# Patient Record
Sex: FEMALE | Employment: OTHER | URBAN - METROPOLITAN AREA
[De-identification: names, ages, dates, MRNs, and addresses within clinical notes are randomized per-mention and may not be internally consistent; named-entity substitution may affect disease eponyms.]

---

## 2018-05-14 ENCOUNTER — HOSPITAL ENCOUNTER (INPATIENT)
Age: 83
LOS: 5 days | Discharge: NURSING, REHAB OR LONG-TERM CARE | DRG: 064 | End: 2018-05-19
Attending: EMERGENCY MEDICINE | Admitting: FAMILY MEDICINE
Payer: MEDICARE

## 2018-05-14 ENCOUNTER — APPOINTMENT (OUTPATIENT)
Dept: CT IMAGING | Age: 83
DRG: 064 | End: 2018-05-14
Attending: EMERGENCY MEDICINE
Payer: MEDICARE

## 2018-05-14 ENCOUNTER — APPOINTMENT (OUTPATIENT)
Dept: GENERAL RADIOLOGY | Age: 83
DRG: 064 | End: 2018-05-14
Attending: EMERGENCY MEDICINE
Payer: MEDICARE

## 2018-05-14 DIAGNOSIS — R20.0 NUMBNESS AND TINGLING OF BOTH LOWER EXTREMITIES: ICD-10-CM

## 2018-05-14 DIAGNOSIS — I63.9 CEREBROVASCULAR ACCIDENT (CVA), UNSPECIFIED MECHANISM (HCC): ICD-10-CM

## 2018-05-14 DIAGNOSIS — R20.2 NUMBNESS AND TINGLING OF BOTH LOWER EXTREMITIES: ICD-10-CM

## 2018-05-14 DIAGNOSIS — R53.1 WEAKNESS: Primary | ICD-10-CM

## 2018-05-14 PROBLEM — E86.0 DEHYDRATION: Status: ACTIVE | Noted: 2018-05-14

## 2018-05-14 PROBLEM — R26.2 INABILITY TO WALK: Status: ACTIVE | Noted: 2018-05-14

## 2018-05-14 PROBLEM — R41.82 ALTERED MENTAL STATUS: Status: ACTIVE | Noted: 2018-05-14

## 2018-05-14 LAB
ALBUMIN SERPL-MCNC: 3.7 G/DL (ref 3.5–5)
ALBUMIN/GLOB SERPL: 1.2 {RATIO} (ref 1.1–2.2)
ALP SERPL-CCNC: 56 U/L (ref 45–117)
ALT SERPL-CCNC: 16 U/L (ref 12–78)
AMMONIA PLAS-SCNC: 19 UMOL/L
ANION GAP SERPL CALC-SCNC: 6 MMOL/L (ref 5–15)
APAP SERPL-MCNC: <2 UG/ML (ref 10–30)
APPEARANCE UR: CLEAR
APTT PPP: 25.9 SEC (ref 22.1–32)
AST SERPL-CCNC: 18 U/L (ref 15–37)
BACTERIA URNS QL MICRO: NEGATIVE /HPF
BASOPHILS # BLD: 0.1 K/UL (ref 0–0.1)
BASOPHILS NFR BLD: 1 % (ref 0–1)
BILIRUB SERPL-MCNC: 0.3 MG/DL (ref 0.2–1)
BILIRUB UR QL: NEGATIVE
BUN SERPL-MCNC: 23 MG/DL (ref 6–20)
BUN/CREAT SERPL: 21 (ref 12–20)
CALCIUM SERPL-MCNC: 8.7 MG/DL (ref 8.5–10.1)
CHLORIDE SERPL-SCNC: 107 MMOL/L (ref 97–108)
CO2 SERPL-SCNC: 28 MMOL/L (ref 21–32)
COLOR UR: NORMAL
CREAT SERPL-MCNC: 1.07 MG/DL (ref 0.55–1.02)
DIFFERENTIAL METHOD BLD: ABNORMAL
EOSINOPHIL # BLD: 0.3 K/UL (ref 0–0.4)
EOSINOPHIL NFR BLD: 4 % (ref 0–7)
EPITH CASTS URNS QL MICRO: NORMAL /LPF
ERYTHROCYTE [DISTWIDTH] IN BLOOD BY AUTOMATED COUNT: 12.8 % (ref 11.5–14.5)
GLOBULIN SER CALC-MCNC: 3 G/DL (ref 2–4)
GLUCOSE BLD STRIP.AUTO-MCNC: 94 MG/DL (ref 65–100)
GLUCOSE SERPL-MCNC: 86 MG/DL (ref 65–100)
GLUCOSE UR STRIP.AUTO-MCNC: NEGATIVE MG/DL
HCT VFR BLD AUTO: 35.5 % (ref 35–47)
HGB BLD-MCNC: 11.7 G/DL (ref 11.5–16)
HGB UR QL STRIP: NEGATIVE
IMM GRANULOCYTES # BLD: 0 K/UL (ref 0–0.04)
IMM GRANULOCYTES NFR BLD AUTO: 0 % (ref 0–0.5)
INR BLD: 0.9 (ref 0.9–1.2)
INR PPP: 1 (ref 0.9–1.1)
KETONES UR QL STRIP.AUTO: NEGATIVE MG/DL
LACTATE SERPL-SCNC: 1.2 MMOL/L (ref 0.4–2)
LEUKOCYTE ESTERASE UR QL STRIP.AUTO: NEGATIVE
LYMPHOCYTES # BLD: 2.7 K/UL (ref 0.8–3.5)
LYMPHOCYTES NFR BLD: 28 % (ref 12–49)
MCH RBC QN AUTO: 32.5 PG (ref 26–34)
MCHC RBC AUTO-ENTMCNC: 33 G/DL (ref 30–36.5)
MCV RBC AUTO: 98.6 FL (ref 80–99)
MONOCYTES # BLD: 1 K/UL (ref 0–1)
MONOCYTES NFR BLD: 11 % (ref 5–13)
NEUTS SEG # BLD: 5.5 K/UL (ref 1.8–8)
NEUTS SEG NFR BLD: 57 % (ref 32–75)
NITRITE UR QL STRIP.AUTO: NEGATIVE
NRBC # BLD: 0 K/UL (ref 0–0.01)
NRBC BLD-RTO: 0 PER 100 WBC
PH UR STRIP: 5.5 [PH] (ref 5–8)
PLATELET # BLD AUTO: 216 K/UL (ref 150–400)
PMV BLD AUTO: 10.4 FL (ref 8.9–12.9)
POTASSIUM SERPL-SCNC: 3.9 MMOL/L (ref 3.5–5.1)
PROT SERPL-MCNC: 6.7 G/DL (ref 6.4–8.2)
PROT UR STRIP-MCNC: NEGATIVE MG/DL
PROTHROMBIN TIME: 9.9 SEC (ref 9–11.1)
RBC # BLD AUTO: 3.6 M/UL (ref 3.8–5.2)
RBC #/AREA URNS HPF: NORMAL /HPF (ref 0–5)
SALICYLATES SERPL-MCNC: 4.3 MG/DL (ref 2.8–20)
SERVICE CMNT-IMP: NORMAL
SODIUM SERPL-SCNC: 141 MMOL/L (ref 136–145)
SP GR UR REFRACTOMETRY: <1.005 (ref 1–1.03)
THERAPEUTIC RANGE,PTTT: NORMAL SECS (ref 58–77)
TROPONIN I SERPL-MCNC: <0.04 NG/ML
TSH SERPL DL<=0.05 MIU/L-ACNC: 0.76 UIU/ML (ref 0.36–3.74)
UR CULT HOLD, URHOLD: NORMAL
UROBILINOGEN UR QL STRIP.AUTO: 0.2 EU/DL (ref 0.2–1)
WBC # BLD AUTO: 9.6 K/UL (ref 3.6–11)
WBC URNS QL MICRO: NORMAL /HPF (ref 0–4)

## 2018-05-14 PROCEDURE — 51701 INSERT BLADDER CATHETER: CPT

## 2018-05-14 PROCEDURE — 70450 CT HEAD/BRAIN W/O DYE: CPT

## 2018-05-14 PROCEDURE — 77030011943

## 2018-05-14 PROCEDURE — 36415 COLL VENOUS BLD VENIPUNCTURE: CPT | Performed by: EMERGENCY MEDICINE

## 2018-05-14 PROCEDURE — 85730 THROMBOPLASTIN TIME PARTIAL: CPT | Performed by: EMERGENCY MEDICINE

## 2018-05-14 PROCEDURE — 65660000000 HC RM CCU STEPDOWN

## 2018-05-14 PROCEDURE — 85610 PROTHROMBIN TIME: CPT | Performed by: EMERGENCY MEDICINE

## 2018-05-14 PROCEDURE — 71046 X-RAY EXAM CHEST 2 VIEWS: CPT

## 2018-05-14 PROCEDURE — 74011250636 HC RX REV CODE- 250/636: Performed by: FAMILY MEDICINE

## 2018-05-14 PROCEDURE — 81001 URINALYSIS AUTO W/SCOPE: CPT | Performed by: EMERGENCY MEDICINE

## 2018-05-14 PROCEDURE — 85025 COMPLETE CBC W/AUTO DIFF WBC: CPT | Performed by: EMERGENCY MEDICINE

## 2018-05-14 PROCEDURE — 80053 COMPREHEN METABOLIC PANEL: CPT | Performed by: EMERGENCY MEDICINE

## 2018-05-14 PROCEDURE — 83605 ASSAY OF LACTIC ACID: CPT | Performed by: FAMILY MEDICINE

## 2018-05-14 PROCEDURE — 93005 ELECTROCARDIOGRAM TRACING: CPT

## 2018-05-14 PROCEDURE — 82607 VITAMIN B-12: CPT | Performed by: INTERNAL MEDICINE

## 2018-05-14 PROCEDURE — 82962 GLUCOSE BLOOD TEST: CPT

## 2018-05-14 PROCEDURE — 82652 VIT D 1 25-DIHYDROXY: CPT | Performed by: INTERNAL MEDICINE

## 2018-05-14 PROCEDURE — 82140 ASSAY OF AMMONIA: CPT | Performed by: FAMILY MEDICINE

## 2018-05-14 PROCEDURE — 99285 EMERGENCY DEPT VISIT HI MDM: CPT

## 2018-05-14 PROCEDURE — 84443 ASSAY THYROID STIM HORMONE: CPT | Performed by: FAMILY MEDICINE

## 2018-05-14 PROCEDURE — 80307 DRUG TEST PRSMV CHEM ANLYZR: CPT | Performed by: FAMILY MEDICINE

## 2018-05-14 PROCEDURE — 84484 ASSAY OF TROPONIN QUANT: CPT | Performed by: EMERGENCY MEDICINE

## 2018-05-14 PROCEDURE — 74011250637 HC RX REV CODE- 250/637: Performed by: FAMILY MEDICINE

## 2018-05-14 PROCEDURE — 85610 PROTHROMBIN TIME: CPT

## 2018-05-14 RX ORDER — SODIUM CHLORIDE 9 MG/ML
75 INJECTION, SOLUTION INTRAVENOUS CONTINUOUS
Status: DISCONTINUED | OUTPATIENT
Start: 2018-05-14 | End: 2018-05-19 | Stop reason: HOSPADM

## 2018-05-14 RX ORDER — IBUPROFEN 200 MG
1 TABLET ORAL DAILY
Status: DISCONTINUED | OUTPATIENT
Start: 2018-05-15 | End: 2018-05-14

## 2018-05-14 RX ORDER — IBUPROFEN 200 MG
1 TABLET ORAL DAILY
Status: DISCONTINUED | OUTPATIENT
Start: 2018-05-14 | End: 2018-05-19 | Stop reason: HOSPADM

## 2018-05-14 RX ORDER — SODIUM CHLORIDE 0.9 % (FLUSH) 0.9 %
5-10 SYRINGE (ML) INJECTION AS NEEDED
Status: DISCONTINUED | OUTPATIENT
Start: 2018-05-14 | End: 2018-05-19 | Stop reason: HOSPADM

## 2018-05-14 RX ORDER — SODIUM CHLORIDE 0.9 % (FLUSH) 0.9 %
5-10 SYRINGE (ML) INJECTION EVERY 8 HOURS
Status: DISCONTINUED | OUTPATIENT
Start: 2018-05-14 | End: 2018-05-19 | Stop reason: HOSPADM

## 2018-05-14 RX ADMIN — SODIUM CHLORIDE 125 ML/HR: 900 INJECTION, SOLUTION INTRAVENOUS at 21:27

## 2018-05-14 RX ADMIN — SODIUM CHLORIDE 500 ML: 900 INJECTION, SOLUTION INTRAVENOUS at 21:27

## 2018-05-14 NOTE — IP AVS SNAPSHOT
Summary of Care Report The Summary of Care report has been created to help improve care coordination. Users with access to [employer] or [address] (Web-based application) may access additional patient information including the Discharge Summary. If you are not currently a [address] user and need more information, please call the number listed below in the [place] [address] section and ask to be connected with Medical Records. Facility Information Name Address Phone [address] [id number] [phone] Patient Information Patient Name Sex YUE Morales ([id number]) Female 1935 Discharge Information Admitting Provider Service Area Unit Jovana Gaming MD / [phone] [address] [facility] 6s [facility] / [phone] Discharge Provider Discharge Date/Time Discharge Disposition Destination (none) 2018 Morning (Pending) SNF (none) Patient Language Language ENGLISH [13] Hospital Problems as of 2018  Reviewed: 2018  8:48 PM by Jovana Gaming MD  
  
  
  
 Class Noted - Resolved Last Modified POA Active Problems Dehydration  2018 - Present 2018 by Jovana Gaming MD Unknown Entered by Jovana Gaming MD  
  Altered mental status  2018 - Present 2018 by Jeremy Esquivel DO Unknown Entered by Jovana Gaming MD  
  Inability to walk  2018 - Present 2018 by Jovana Gaming MD Unknown Entered by Jovana Gaming MD  
  Numbness and tingling of both lower extremities  2018 - Present 2018 by Jovana Gaming MD Unknown Entered by Jovana Gaming MD  
  * (Principal)Stroke (cerebrum) [facility])  2018 - Present 2018 by Jeremy Esquivel DO Unknown   Entered by Jeremy Esquivel DO  
  
 Non-Hospital Problems as of 5/19/2018  Reviewed: 5/14/2018  8:48 PM by Shreya Pierce MD  
 None You are allergic to the following No active allergies Current Discharge Medication List  
  
START taking these medications Dose & Instructions Dispensing Information Comments  
 clopidogrel 75 mg Tab Commonly known as:  PLAVIX Start taking on:  5/20/2018 Dose:  75 mg Take 1 Tab by mouth daily. Quantity:  30 Tab Refills:  0  
   
 levothyroxine 88 mcg tablet Commonly known as:  SYNTHROID Start taking on:  5/20/2018 Dose:  88 mcg Take 1 Tab by mouth Daily (before breakfast). Quantity:  30 Tab Refills:  0 CONTINUE these medications which have CHANGED Dose & Instructions Dispensing Information Comments  
 amLODIPine 5 mg tablet Commonly known as:  Ayaz Rubio What changed:  how much to take Dose:  2.5 mg Take 0.5 Tabs by mouth daily. Indications: hypertension Quantity:  30 Tab Refills:  0  
   
 atenolol 25 mg tablet Commonly known as:  TENORMIN What changed:  how much to take Dose:  12.5 mg Take 0.5 Tabs by mouth daily. Quantity:  30 Tab Refills:  0 CONTINUE these medications which have NOT CHANGED Dose & Instructions Dispensing Information Comments  
 gabapentin 100 mg capsule Commonly known as:  NEURONTIN Dose:  100 mg Take 100 mg by mouth three (3) times daily. Refills:  0  
   
 mirtazapine 15 mg tablet Commonly known as:  Rozanna Plume Dose:  15 mg Take 15 mg by mouth nightly. Refills:  0  
   
 simvastatin 20 mg tablet Commonly known as:  ZOCOR Dose:  20 mg Take 20 mg by mouth nightly. Refills:  0  
   
 temazepam 15 mg capsule Commonly known as:  RESTORIL Dose:  30 mg Take 2 Caps by mouth nightly as needed for Sleep. Max Daily Amount: 30 mg. Indications: Insomnia Quantity:  30 Cap Refills:  0  
   
 traZODone 50 mg tablet Commonly known as:  Victor Bloodgood  Dose:  100 mg  
 Take 2 Tabs by mouth nightly. Quantity:  30 Tab Refills:  0  
   
 VITAMIN B-12 1,000 mcg tablet Generic drug:  cyanocobalamin Dose:  1000 mcg Take 1,000 mcg by mouth daily. Refills:  0 Follow-up Information Follow up With Details Comments Contact Info [address] at [facility]. [phone] Steve Clark MD   Patient can only remember the practice name and not the physician Discharge Instructions Discharge SNF/Rehab Instructions/LTAC  
 
 
PATIENT ID: Herberth Tripathi MRN: [id number] YOB: 1935 DATE OF ADMISSION: 5/14/2018  6:40 PM   
DATE OF DISCHARGE: 5/19/2018 PRIMARY CARE PROVIDER: Steve Clark MD  
 
 
ATTENDING PHYSICIAN: Betty Sargent MD 
DISCHARGING PROVIDER: Betty Sargent MD    
To contact this individual call [phone] and ask the  to page. If unavailable ask to be transferred the Adult Hospitalist Department. CONSULTATIONS: IP CONSULT TO HOSPITALIST 
IP CONSULT TO NEUROLOGY 
IP CONSULT TO PSYCHIATRY PROCEDURES/SURGERIES: * No surgery found * ADMITTING DIAGNOSES: 
An 58-year-old white female with past medical history of peripheral neuropathy, breast cancer status post bilateral mastectomy, hyperlipidemia, hypothyroidism, presented to the emergency department from home with altered mental status and chief complaint of bilateral leg and feet numbness. She has not complained of any new onset blurred vision, diplopia, slurred speech, neck pain, back pain, chest pain, shortness of breath, cough, congestion, abdominal pain, nausea, vomiting, diarrhea, melena, dysuria, hematuria or bowel or bladder incontinence calf pain, swelling, fever, chills, rash, head and neck trauma or falls. Her son and daughter-in-law later note that the patient has poor recollection of the events from earlier today.   He notes that her symptoms were severe such that she was slumped over after eating a sandwich. There are no reports of any choking. There are no reports of any respiratory difficulty. He notes that rather she had loss of memory and amnesia of events that occurred this afternoon. He notes this is a definite change compared to her baseline. Evaluation in the emergency department for vital signs of blood pressure 125/72, heart rate 107, respirations 16, saturation 97% on room air. Labs showed a BUN of 23, creatinine 1.07. CT:  Head without contrast, atrophy, significant small vessel ischemic changes with a focal area of chronic ischemia in the left cerebellum with no acute abnormalities. Code stroke had been called. Patient admitted to neurology unit. HOSPITAL COURSE and DISCHARGE DIAGNOSES / PLAN: #. Altered mental status; resolved now, 
-Metabolic encephalopathy, related to dehydration vs Acute stroke. #. Acute stroke: MRI brain showed left parietal convexity acute ischemic changes  without significant associated hemorrhage or mass effect. 
-Carotid Doppler without hemodynamically significant stenosis -TTE reported Normal LV systolic function, EF 49-54%, no obvious wall motion abnormality, grade 1 diastolic dysfunction. -neurology evaluation appreciated; suggested to change ASA to Plavix, continue Statin and Stoke education #. Bilateral LE weakness associated with numbness; 
-Probably related to severe peripheral neuropathy, on physical exam noted decreased LE sensation but the motor strength is good. -According to the pt, this has been going on for more than 2 months. 
-On Gabapentin  
-Vit B12 WNL,  Vit D normal, copper 70 and ; Immune electrophoresis IgG 556 others with in normal limit. 
-Arrange for Neuro F/U 2-4 weeks- may consider EMG PN protocol to further evaluate LE paresthesias. 
-Continue PT/OT #. Dehydration: continue gentle IV hydration, encourage PO intake Patient agreeable to IVF #. HTN 
 BP meds adjusted; decreased Amlodipine to 2.5mg daily and Atenolol 12.5mg daily #. Hypothyroidism; on levothyroxine #. Moderate Chronic Protein Calorie Malnutrition; nutritionist consult #. Insomnia: continue prn home meds for sleep PENDING TEST RESULTS:  
At the time of discharge the following test results are still pending: none FOLLOW UP APPOINTMENTS:   
Follow-up Information Follow up With Details Comments Contact Info [address] at [employer] [facility]. [phone] Steve Clark MD   Patient can only remember the practice name and not the physician ADDITIONAL CARE RECOMMENDATIONS: Follow up with neurology for possible EMG 
 
DIET: Regular Diet Oral Nutritional Supplements: Ensure Enlive Twice daily ACTIVITY: Activity as tolerated DISCHARGE MEDICATIONS: 
 See Medication Reconciliation Form NOTIFY YOUR PHYSICIAN FOR ANY OF THE FOLLOWING:  
Fever over 101 degrees for 24 hours. Chest pain, shortness of breath, fever, chills, nausea, vomiting, diarrhea, change in mentation, falling, weakness, bleeding. Severe pain or pain not relieved by medications. Or, any other signs or symptoms that you may have questions about. DISPOSITION: 
  Home With: 
 OT  PT  HH  RN  
  
x SNF/Inpatient Rehab/LTAC Independent/assisted living Hospice Other:  
 
 
PATIENT CONDITION AT DISCHARGE:  
 
Functional status  
x Poor Deconditioned Independent Cognition  
x  Lucid Forgetful Dementia Catheters/lines (plus indication) [place] PICC   
 PEG   
x None Code status  
 x Full code DNR   
 
PHYSICAL EXAMINATION AT DISCHARGE: 
  
Constitutional:  No acute distress, cooperative, pleasant ENT:  Oral mucous moist, oropharynx benign. Neck supple, Resp:  CTA bilaterally. No wheezing/rhonchi/rales. No accessory muscle use CV:  Regular rhythm, normal rate, no murmurs, gallops, rubs GI:  Soft, non distended, non tender. normoactive bowel sounds, no hepatosplenomegaly Musculoskeletal:  No edema, warm, 2+ pulses throughout Neurologic:  Moves all extremities; decreased b/l LE sensation. AAOx3, CN II-XII reviewed Psych:  Good insight, Not anxious nor agitated. CHRONIC MEDICAL DIAGNOSES: 
Problem List as of 5/19/2018  Date Reviewed: 5/14/2018 Codes Class Noted - Resolved * (Principal)Stroke (cerebrum) (HCC) ICD-10-CM: I63.9 ICD-9-CM: 434.91  5/16/2018 - Present Dehydration ICD-10-CM: E86.0 ICD-9-CM: 276.51  5/14/2018 - Present Altered mental status ICD-10-CM: R41.82 
ICD-9-CM: 780.97  5/14/2018 - Present Inability to walk ICD-10-CM: R26.2 ICD-9-CM: 719.7  5/14/2018 - Present Numbness and tingling of both lower extremities ICD-10-CM: R20.0, R20.2 ICD-9-CM: 782.0  5/14/2018 - Present Signed:  
Yrn Herrera MD 
5/19/2018 
9:52 AM 
 
  
 
Chart Review Routing History No Routing History on File

## 2018-05-14 NOTE — ED NOTES
Verbal shift change report given to Amalia Moran 44 (oncoming nurse) by Prateek Orr (offgoing nurse). Report included the following information SBAR and ED Summary.

## 2018-05-14 NOTE — ED TRIAGE NOTES
Triage note: Pt arrives in wheelchair with c/o bilateral foot and leg numbness. Per pt's family pt is usually able to ambulate but cannot bear weight today. Hx neuropathy. Pt started on gabapentin yesterday; previously taking neurontin. Pt's son adds that pt had period of AMS around 1330 while eating a sandwich, pt unable to answer questions and slumped over. No other deficits noted by family members. Symptoms have resolved. AO x 4 in triage.

## 2018-05-14 NOTE — IP AVS SNAPSHOT
[address] [facility] [address] [place] [address] 
[phone] Patient: Suyapa Stevenson MRN: [id number] RUE:[birth year] A check obed indicates which time of day the medication should be taken. My Medications START taking these medications Instructions Each Dose to Equal  
 Morning Noon Evening Bedtime  
 clopidogrel 75 mg Tab Commonly known as:  PLAVIX Start taking on:  5/20/2018 Your last dose was: Your next dose is: Take 1 Tab by mouth daily. 75 mg  
    
   
   
   
  
 levothyroxine 88 mcg tablet Commonly known as:  SYNTHROID Start taking on:  5/20/2018 Your last dose was: Your next dose is: Take 1 Tab by mouth Daily (before breakfast). 88 mcg CHANGE how you take these medications Instructions Each Dose to Equal  
 Morning Noon Evening Bedtime  
 amLODIPine 5 mg tablet Commonly known as:  Lennie Joy What changed:  how much to take Your last dose was: Your next dose is: Take 0.5 Tabs by mouth daily. Indications: hypertension 2.5 mg  
    
   
   
   
  
 atenolol 25 mg tablet Commonly known as:  TENORMIN What changed:  how much to take Your last dose was: Your next dose is: Take 0.5 Tabs by mouth daily. 12.5 mg  
    
   
   
   
  
  
CONTINUE taking these medications Instructions Each Dose to Equal  
 Morning Noon Evening Bedtime  
 gabapentin 100 mg capsule Commonly known as:  NEURONTIN Your last dose was: Your next dose is: Take 100 mg by mouth three (3) times daily. 100 mg  
    
   
   
   
  
 mirtazapine 15 mg tablet Commonly known as:  Timothy Swanson Your last dose was: Your next dose is: Take 15 mg by mouth nightly. 15 mg  
    
   
   
   
  
 simvastatin 20 mg tablet Commonly known as:  ZOCOR  
   
 Your last dose was: Your next dose is: Take 20 mg by mouth nightly. 20 mg  
    
   
   
   
  
 temazepam 15 mg capsule Commonly known as:  RESTORIL Your last dose was: Your next dose is: Take 2 Caps by mouth nightly as needed for Sleep. Max Daily Amount: 30 mg. Indications: Insomnia 30 mg  
    
   
   
   
  
 traZODone 50 mg tablet Commonly known as:  Cayetano Aquino Your last dose was: Your next dose is: Take 2 Tabs by mouth nightly. 100 mg  
    
   
   
   
  
 VITAMIN B-12 1,000 mcg tablet Generic drug:  cyanocobalamin Your last dose was: Your next dose is: Take 1,000 mcg by mouth daily. 1000 mcg Where to Get Your Medications Information on where to get these meds will be given to you by the nurse or doctor. ! Ask your nurse or doctor about these medications  
  amLODIPine 5 mg tablet  
 atenolol 25 mg tablet  
 clopidogrel 75 mg Tab  
 levothyroxine 88 mcg tablet  
 temazepam 15 mg capsule  
 traZODone 50 mg tablet

## 2018-05-14 NOTE — IP AVS SNAPSHOT
[address] 
[phone] Patient: Faustino Kay MRN: [id number] WLR:[id number] About your hospitalization You were admitted on:  May 14, 2018 You last received care in the:  [facility] 6S NEURO-SCI TELE You were discharged on:  May 19, 2018 Why you were hospitalized Your primary diagnosis was:  Stroke (Cerebrum) (Hcc) Your diagnoses also included:  Dehydration, Altered Mental Status, Inability To Walk, Numbness And Tingling Of Both Lower Extremities Follow-up Information Follow up With Details Comments Contact Info [address] at [employer]. [phone] Phys Other, MD   Patient can only remember the practice name and not the physician Discharge Orders None A check obed indicates which time of day the medication should be taken. My Medications START taking these medications Instructions Each Dose to Equal  
 Morning Noon Evening Bedtime  
 clopidogrel 75 mg Tab Commonly known as:  PLAVIX Start taking on:  5/20/2018 Your last dose was: Your next dose is: Take 1 Tab by mouth daily. 75 mg  
    
   
   
   
  
 levothyroxine 88 mcg tablet Commonly known as:  SYNTHROID Start taking on:  5/20/2018 Your last dose was: Your next dose is: Take 1 Tab by mouth Daily (before breakfast). 88 mcg CHANGE how you take these medications Instructions Each Dose to Equal  
 Morning Noon Evening Bedtime  
 amLODIPine 5 mg tablet Commonly known as:  Romaine Mcfarland What changed:  how much to take Your last dose was: Your next dose is: Take 0.5 Tabs by mouth daily. Indications: hypertension 2.5 mg  
    
   
   
   
  
 atenolol 25 mg tablet Commonly known as:  TENORMIN What changed:  how much to take Your last dose was: Your next dose is: Take 0.5 Tabs by mouth daily. 12.5 mg  
    
   
   
   
  
  
CONTINUE taking these medications Instructions Each Dose to Equal  
 Morning Noon Evening Bedtime  
 gabapentin 100 mg capsule Commonly known as:  NEURONTIN Your last dose was: Your next dose is: Take 100 mg by mouth three (3) times daily. 100 mg  
    
   
   
   
  
 mirtazapine 15 mg tablet Commonly known as:  Caitlin Nestle Your last dose was: Your next dose is: Take 15 mg by mouth nightly. 15 mg  
    
   
   
   
  
 simvastatin 20 mg tablet Commonly known as:  ZOCOR Your last dose was: Your next dose is: Take 20 mg by mouth nightly. 20 mg  
    
   
   
   
  
 temazepam 15 mg capsule Commonly known as:  RESTORIL Your last dose was: Your next dose is: Take 2 Caps by mouth nightly as needed for Sleep. Max Daily Amount: 30 mg. Indications: Insomnia 30 mg  
    
   
   
   
  
 traZODone 50 mg tablet Commonly known as:  Marcelino Austen Your last dose was: Your next dose is: Take 2 Tabs by mouth nightly. 100 mg  
    
   
   
   
  
 VITAMIN B-12 1,000 mcg tablet Generic drug:  cyanocobalamin Your last dose was: Your next dose is: Take 1,000 mcg by mouth daily. 1000 mcg Where to Get Your Medications Information on where to get these meds will be given to you by the nurse or doctor. ! Ask your nurse or doctor about these medications  
  amLODIPine 5 mg tablet  
 atenolol 25 mg tablet  
 clopidogrel 75 mg Tab  
 levothyroxine 88 mcg tablet  
 temazepam 15 mg capsule  
 traZODone 50 mg tablet Discharge Instructions Discharge SNF/Rehab Instructions/LTAC  
 
 
PATIENT ID: Manfred Mendoza MRN: [id number] YOB: 1935 DATE OF ADMISSION: 5/14/2018  6:40 PM   
 DATE OF DISCHARGE: 5/19/2018 PRIMARY CARE PROVIDER: Steve Clark MD  
 
 
ATTENDING PHYSICIAN: Jolie Wilson MD 
DISCHARGING PROVIDER: Jolie Wilson MD    
To contact this individual call [phone] and ask the  to page. If unavailable ask to be transferred the Adult Hospitalist Department. CONSULTATIONS: IP CONSULT TO HOSPITALIST 
IP CONSULT TO NEUROLOGY 
IP CONSULT TO PSYCHIATRY PROCEDURES/SURGERIES: * No surgery found * ADMITTING DIAGNOSES: 
An 80-year-old white female with past medical history of peripheral neuropathy, breast cancer status post bilateral mastectomy, hyperlipidemia, hypothyroidism, presented to the emergency department from home with altered mental status and chief complaint of bilateral leg and feet numbness. She has not complained of any new onset blurred vision, diplopia, slurred speech, neck pain, back pain, chest pain, shortness of breath, cough, congestion, abdominal pain, nausea, vomiting, diarrhea, melena, dysuria, hematuria or bowel or bladder incontinence calf pain, swelling, fever, chills, rash, head and neck trauma or falls. Her son and daughter-in-law later note that the patient has poor recollection of the events from earlier today. He notes that her symptoms were severe such that she was slumped over after eating a sandwich. There are no reports of any choking. There are no reports of any respiratory difficulty. He notes that rather she had loss of memory and amnesia of events that occurred this afternoon. He notes this is a definite change compared to her baseline. Evaluation in the emergency department for vital signs of blood pressure 125/72, heart rate 107, respirations 16, saturation 97% on room air. Labs showed a BUN of 23, creatinine 1.07.   CT:  Head without contrast, atrophy, significant small vessel ischemic changes with a focal area of chronic ischemia in the left cerebellum with no acute abnormalities. Code stroke had been called. Patient admitted to neurology unit. HOSPITAL COURSE and DISCHARGE DIAGNOSES / PLAN: #. Altered mental status; resolved now, 
-Metabolic encephalopathy, related to dehydration vs Acute stroke. #. Acute stroke: MRI brain showed left parietal convexity acute ischemic changes  without significant associated hemorrhage or mass effect. 
-Carotid Doppler without hemodynamically significant stenosis -TTE reported Normal LV systolic function, EF 69-91%, no obvious wall motion abnormality, grade 1 diastolic dysfunction. -neurology evaluation appreciated; suggested to change ASA to Plavix, continue Statin and Stoke education #. Bilateral LE weakness associated with numbness; 
-Probably related to severe peripheral neuropathy, on physical exam noted decreased LE sensation but the motor strength is good. -According to the pt, this has been going on for more than 2 months. 
-On Gabapentin  
-Vit B12 WNL,  Vit D normal, copper 70 and ; Immune electrophoresis IgG 556 others with in normal limit. 
-Arrange for Neuro F/U 2-4 weeks- may consider EMG PN protocol to further evaluate LE paresthesias. 
-Continue PT/OT #. Dehydration: continue gentle IV hydration, encourage PO intake Patient agreeable to IVF #. HTN 
BP meds adjusted; decreased Amlodipine to 2.5mg daily and Atenolol 12.5mg daily #. Hypothyroidism; on levothyroxine #. Moderate Chronic Protein Calorie Malnutrition; nutritionist consult #. Insomnia: continue prn home meds for sleep PENDING TEST RESULTS:  
At the time of discharge the following test results are still pending: none FOLLOW UP APPOINTMENTS:   
Follow-up Information Follow up With Details Comments Contact Info [address] at [employer]. [phone] Steve Clark MD   Patient can only remember the practice name and not the physician ADDITIONAL CARE RECOMMENDATIONS: Follow up with neurology for possible EMG 
 
DIET: Regular Diet Oral Nutritional Supplements: Ensure Enlive Twice daily ACTIVITY: Activity as tolerated DISCHARGE MEDICATIONS: 
 See Medication Reconciliation Form NOTIFY YOUR PHYSICIAN FOR ANY OF THE FOLLOWING:  
Fever over 101 degrees for 24 hours. Chest pain, shortness of breath, fever, chills, nausea, vomiting, diarrhea, change in mentation, falling, weakness, bleeding. Severe pain or pain not relieved by medications. Or, any other signs or symptoms that you may have questions about. DISPOSITION: 
  Home With: 
 OT  PT  HH  RN  
  
x SNF/Inpatient Rehab/LTAC Independent/assisted living Hospice Other:  
 
 
PATIENT CONDITION AT DISCHARGE:  
 
Functional status  
x Poor Deconditioned Independent Cognition  
x  Lucid Forgetful Dementia Catheters/lines (plus indication) [place]ada PICC   
 PEG   
x None Code status  
 x Full code DNR   
 
PHYSICAL EXAMINATION AT DISCHARGE: 
  
Constitutional:  No acute distress, cooperative, pleasant ENT:  Oral mucous moist, oropharynx benign. Neck supple, Resp:  CTA bilaterally. No wheezing/rhonchi/rales. No accessory muscle use CV:  Regular rhythm, normal rate, no murmurs, gallops, rubs GI:  Soft, non distended, non tender. normoactive bowel sounds, no hepatosplenomegaly Musculoskeletal:  No edema, warm, 2+ pulses throughout Neurologic:  Moves all extremities; decreased b/l LE sensation. AAOx3, CN II-XII reviewed Psych:  Good insight, Not anxious nor agitated. CHRONIC MEDICAL DIAGNOSES: 
Problem List as of 5/19/2018  Date Reviewed: 5/14/2018 Codes Class Noted - Resolved * (Principal)Stroke (cerebrum) (HCC) ICD-10-CM: I63.9 ICD-9-CM: 434.91  5/16/2018 - Present Dehydration ICD-10-CM: E86.0 ICD-9-CM: 276.51  5/14/2018 - Present Altered mental status ICD-10-CM: R41.82 
ICD-9-CM: 780.97  5/14/2018 - Present Inability to walk ICD-10-CM: R26.2 ICD-9-CM: 719.7  5/14/2018 - Present Numbness and tingling of both lower extremities ICD-10-CM: R20.0, R20.2 ICD-9-CM: 782.0  5/14/2018 - Present Signed:  
Rafael White MD 
5/19/2018 
9:52 AM 
 
  
 
  
  
  
[facility] Announcement We are excited to announce that we are making your provider's discharge notes available to you in [facility]. You will see these notes when they are completed and signed by the physician that discharged you from your recent hospital stay. If you have any questions or concerns about any information you see in [facility], please call the Health Information Department where you were seen or reach out to your Primary Care Provider for more information about your plan of care. Introducing [facility]! [facility] introduces [facility] patient portal. Now you can access parts of your medical record, email your doctor's office, and request medication refills online. 1. In your internet browser, go to [email or web address] 2. Click on the First Time User? Click Here link in the Sign In box. You will see the New Member Sign Up page. 3. Enter your [facility] Access Code exactly as it appears below. You will not need to use this code after youve completed the sign-up process. If you do not sign up before the expiration date, you must request a new code. · [facility] Access Code: [email or web address] Expires: 8/12/2018  6:34 PM 
 
4. Enter the last four digits of your Social Security Number (xxxx) and Date of Birth (mm/dd/yyyy) as indicated and click Submit. You will be taken to the next sign-up page. 5. Create a [employer] ID. This will be your [facility] login ID and cannot be changed, so think of one that is secure and easy to remember. 6. Create a [employer] password. You can change your password at any time. 7. Enter your Password Reset Question and Answer. This can be used at a later time if you forget your password. 8. Enter your e-mail address. You will receive e-mail notification when new information is available in [address] 9. Click Sign Up. You can now view and download portions of your medical record. 10. Click the Download Summary menu link to download a portable copy of your medical information. If you have questions, please visit the Frequently Asked Questions section of the [employer] website. Remember, [employer] is NOT to be used for urgent needs. For medical emergencies, dial 911. Now available from your iPhone and Android! Introducing Lopez Goff As a [facility] patient, I wanted to make you aware of our electronic visit tool called Lopez Goff. [employer]SoftArt allows you to connect within minutes with a medical provider 24 hours a day, seven days a week via a mobile device or tablet or logging into a secure website from your computer. You can access Lopez Goff from anywhere in the [place]. A virtual visit might be right for you when you have a simple condition and feel like you just dont want to get out of bed, or cant get away from work for an appointment, when your regular [facility] [facility] provider is not available (evenings, weekends or holidays), or when youre out of town and need minor care. Electronic visits cost only $49 and if the [employer] provider determines a prescription is needed to treat your condition, one can be electronically transmitted to a nearby pharmacy*. Please take a moment to enroll today if you have not already done so. The enrollment process is free and takes just a few minutes. To enroll, please download the LoHaria/SoftArt sarah to your tablet or phone, or visit [email or web address] to enroll on your computer.    
And, as an [address] [place] [address] patient with a [employer] account, the results of your visits will be scanned into your electronic medical record and your primary care provider will be able to view the scanned results. We urge you to continue to see your regular [facility] provider for your ongoing medical care. And while your primary care provider may not be the one available when you seek a Lopez Goff virtual visit, the peace of mind you get from getting a real diagnosis real time can be priceless. For more information on Lopez Espinozafin, view our Frequently Asked Questions (FAQs) at [email or web address]. Sincerely, 
 
Khushbu Thomas MD 
Chief Medical Officer [employer] *:  certain medications cannot be prescribed via [employer] Unresulted Labs-Please follow up with your PCP about these lab tests Order Current Status IMMUNOELECTROPHORESIS [facility].) Preliminary result Providers Seen During Your Hospitalization Provider Specialty Primary office phone Nimo Khan MD Emergency Medicine [phone] Mahendra Santiago MD Family Practice [phone] Qi Sharp MD Hospitalist [phone] Ruma Roth MD Internal Medicine [phone] Your Primary Care Physician (PCP) Primary Care Physician Office Phone Office Fax OTHER, PHYS ** None ** ** None ** You are allergic to the following No active allergies Recent Documentation Height Weight BMI OB Status 1.575 m 48.4 kg 19.52 kg/m2 Postmenopausal    
  
  
Emergency Contacts Name Discharge Info Relation Home Work Mobile [address] [facility] CAREGIVER [3] Son [22] [phone] Patient Belongings The following personal items are in your possession at time of discharge: 
  Dental Appliances: None  Visual Aid: Glasses, With patient      Home Medications: None   Jewelry: None  Clothing: At bedside    Other Valuables: None Please provide this summary of care documentation to your next provider. Signatures-by signing, you are acknowledging that this After Visit Summary has been reviewed with you and you have received a copy. Patient Signature:  ____________________________________________________________ Date:  ____________________________________________________________  
  
Ricarda Pane Provider Signature:  ____________________________________________________________ Date:  ____________________________________________________________

## 2018-05-14 NOTE — ED PROVIDER NOTES
HPI Comments: 80 y.o. female with past medical history significant for neuropathy, breast cancer, and hypercholesteremia who presents from home with chief complaint of numbness. EMS reports Pt was walking 5 hours ago when she slumped over, and would not talk or answer questions. However, Pt's family states Pt was able to eat/chew at that time. Per EMS, Pt has unable to bear weight on her legs since then, but she was able to talk when they arrived. Pt currently c/o numbness in her bilateral feet for 1 month. Per Pt's relatives, Pt usually is able to walk and drive unassisted, but she has been unstable walking for the last several days. Pt takes gabapentin for neuropathy and is scheduled for cardiology evaluation next week. Pt denies abdominal pain, N/V/D, urine changes, chest pain, and SOB. There are no other acute medical concerns at this time. Note written by Wil Stern, as dictated by Enedina Ramirez MD 6:46 PM    The history is provided by the patient, the EMS personnel and a relative. Past Medical History:   Diagnosis Date    Breast cancer ([facility] [address]     Hypercholesteremia     Neuropathy        No past surgical history on file. No family history on file. Social History     Social History    Marital status: N/A     Spouse name: N/A    Number of children: N/A    Years of education: N/A     Occupational History    Not on file. Social History Main Topics    Smoking status: Not on file    Smokeless tobacco: Not on file    Alcohol use Not on file    Drug use: Not on file    Sexual activity: Not on file     Other Topics Concern    Not on file     Social History Narrative    No narrative on file         ALLERGIES: Review of patient's allergies indicates no known allergies. Review of Systems   Constitutional: Negative for chills and fever. Respiratory: Negative for shortness of breath. Cardiovascular: Negative for chest pain.    Gastrointestinal: Negative for abdominal pain, constipation, diarrhea and vomiting. Genitourinary: Negative for dysuria and hematuria. Musculoskeletal: Positive for gait problem. Neurological: Positive for speech difficulty, weakness and numbness. Negative for dizziness and light-headedness. All other systems reviewed and are negative. Vitals:    05/14/18 1834   BP: 125/72   Pulse: (!) 107   Resp: 16   Temp: 97.9 °F (36.6 °C)   SpO2: 97%   Height: 5' 2\" (1.575 m)            Physical Exam   Constitutional: She is oriented to person, place, and time. She appears well-developed. No distress. HENT:   Head: Normocephalic and atraumatic. Eyes: Pupils are equal, round, and reactive to light. No scleral icterus. Neck: Normal range of motion. Neck supple. Cardiovascular: Normal rate and regular rhythm. Pulmonary/Chest: Effort normal and breath sounds normal.   Abdominal: Soft. She exhibits no distension. There is no tenderness. There is no rebound and no guarding. Musculoskeletal: Normal range of motion. Neurological: She is alert and oriented to person, place, and time. Equal strength bilaterally. No pronator drift. Skin: Skin is warm and dry. She is not diaphoretic. Psychiatric: She has a normal mood and affect. Her behavior is normal. Thought content normal.   Nursing note and vitals reviewed. Note written by Wil aCsey, as dictated by Dewey De Los Santos MD 6:46 PM    MDM  Number of Diagnoses or Management Options  Weakness: new and requires workup  Diagnosis management comments: DDX:  CVA, UTI, PNA, electrolyte abnormality, hypothyroid, peripheral neuropathy, lumbar radiculopathy    Total critical care time spent exclusive of procedures:  35 minutes          ED Course       Procedures      ED EKG interpretation:  Rhythm: normal sinus rhythm; Rate (approx.): 74; Axis: normal; ST/T wave: normal; normal intervals; no ectopy.    Note written by Wil Casey, as dictated by Dewey De Los Santos MD 7:05 PM    CONSULT NOTE:  6:47 PM Santi Rodriguez MD spoke with Dr. Chelsi Moura, Consult for Teleneurology. Discussed available diagnostic tests and clinical findings. Dr. Chelsi Moura states Pt has no focal neurologic deficits and she is outside the window for TPA. He recommends admission for stroke workup.

## 2018-05-15 ENCOUNTER — APPOINTMENT (OUTPATIENT)
Dept: MRI IMAGING | Age: 83
DRG: 064 | End: 2018-05-15
Attending: FAMILY MEDICINE
Payer: MEDICARE

## 2018-05-15 LAB
ANION GAP SERPL CALC-SCNC: 5 MMOL/L (ref 5–15)
ATRIAL RATE: 74 BPM
BASOPHILS # BLD: 0.1 K/UL (ref 0–0.1)
BASOPHILS NFR BLD: 1 % (ref 0–1)
BUN SERPL-MCNC: 18 MG/DL (ref 6–20)
BUN/CREAT SERPL: 22 (ref 12–20)
CALCIUM SERPL-MCNC: 8.3 MG/DL (ref 8.5–10.1)
CALCULATED P AXIS, ECG09: 46 DEGREES
CALCULATED R AXIS, ECG10: 21 DEGREES
CALCULATED T AXIS, ECG11: 48 DEGREES
CHLORIDE SERPL-SCNC: 112 MMOL/L (ref 97–108)
CO2 SERPL-SCNC: 27 MMOL/L (ref 21–32)
CREAT SERPL-MCNC: 0.81 MG/DL (ref 0.55–1.02)
DIAGNOSIS, 93000: NORMAL
DIFFERENTIAL METHOD BLD: ABNORMAL
EOSINOPHIL # BLD: 0.3 K/UL (ref 0–0.4)
EOSINOPHIL NFR BLD: 4 % (ref 0–7)
ERYTHROCYTE [DISTWIDTH] IN BLOOD BY AUTOMATED COUNT: 12.6 % (ref 11.5–14.5)
GLUCOSE SERPL-MCNC: 103 MG/DL (ref 65–100)
HCT VFR BLD AUTO: 32.3 % (ref 35–47)
HGB BLD-MCNC: 10.7 G/DL (ref 11.5–16)
IMM GRANULOCYTES # BLD: 0 K/UL (ref 0–0.04)
IMM GRANULOCYTES NFR BLD AUTO: 0 % (ref 0–0.5)
LYMPHOCYTES # BLD: 3 K/UL (ref 0.8–3.5)
LYMPHOCYTES NFR BLD: 35 % (ref 12–49)
MAGNESIUM SERPL-MCNC: 1.9 MG/DL (ref 1.6–2.4)
MCH RBC QN AUTO: 32.6 PG (ref 26–34)
MCHC RBC AUTO-ENTMCNC: 33.1 G/DL (ref 30–36.5)
MCV RBC AUTO: 98.5 FL (ref 80–99)
MONOCYTES # BLD: 0.9 K/UL (ref 0–1)
MONOCYTES NFR BLD: 10 % (ref 5–13)
NEUTS SEG # BLD: 4.3 K/UL (ref 1.8–8)
NEUTS SEG NFR BLD: 50 % (ref 32–75)
NRBC # BLD: 0 K/UL (ref 0–0.01)
NRBC BLD-RTO: 0 PER 100 WBC
P-R INTERVAL, ECG05: 182 MS
PHOSPHATE SERPL-MCNC: 2.8 MG/DL (ref 2.6–4.7)
PLATELET # BLD AUTO: 193 K/UL (ref 150–400)
PMV BLD AUTO: 10.5 FL (ref 8.9–12.9)
POTASSIUM SERPL-SCNC: 3.7 MMOL/L (ref 3.5–5.1)
Q-T INTERVAL, ECG07: 388 MS
QRS DURATION, ECG06: 86 MS
QTC CALCULATION (BEZET), ECG08: 430 MS
RBC # BLD AUTO: 3.28 M/UL (ref 3.8–5.2)
SODIUM SERPL-SCNC: 144 MMOL/L (ref 136–145)
VENTRICULAR RATE, ECG03: 74 BPM
VIT B12 SERPL-MCNC: 732 PG/ML (ref 193–986)
WBC # BLD AUTO: 8.7 K/UL (ref 3.6–11)

## 2018-05-15 PROCEDURE — 65660000000 HC RM CCU STEPDOWN

## 2018-05-15 PROCEDURE — 84100 ASSAY OF PHOSPHORUS: CPT | Performed by: FAMILY MEDICINE

## 2018-05-15 PROCEDURE — 83735 ASSAY OF MAGNESIUM: CPT | Performed by: FAMILY MEDICINE

## 2018-05-15 PROCEDURE — 85025 COMPLETE CBC W/AUTO DIFF WBC: CPT | Performed by: FAMILY MEDICINE

## 2018-05-15 PROCEDURE — 97116 GAIT TRAINING THERAPY: CPT

## 2018-05-15 PROCEDURE — 97161 PT EVAL LOW COMPLEX 20 MIN: CPT

## 2018-05-15 PROCEDURE — G8978 MOBILITY CURRENT STATUS: HCPCS

## 2018-05-15 PROCEDURE — 80048 BASIC METABOLIC PNL TOTAL CA: CPT | Performed by: FAMILY MEDICINE

## 2018-05-15 PROCEDURE — 74011250637 HC RX REV CODE- 250/637: Performed by: FAMILY MEDICINE

## 2018-05-15 PROCEDURE — 97165 OT EVAL LOW COMPLEX 30 MIN: CPT

## 2018-05-15 PROCEDURE — 97535 SELF CARE MNGMENT TRAINING: CPT

## 2018-05-15 PROCEDURE — 36415 COLL VENOUS BLD VENIPUNCTURE: CPT | Performed by: FAMILY MEDICINE

## 2018-05-15 PROCEDURE — G8979 MOBILITY GOAL STATUS: HCPCS

## 2018-05-15 PROCEDURE — 74011250637 HC RX REV CODE- 250/637: Performed by: INTERNAL MEDICINE

## 2018-05-15 PROCEDURE — 70551 MRI BRAIN STEM W/O DYE: CPT

## 2018-05-15 PROCEDURE — 93880 EXTRACRANIAL BILAT STUDY: CPT

## 2018-05-15 RX ORDER — SIMVASTATIN 20 MG/1
20 TABLET, FILM COATED ORAL
COMMUNITY

## 2018-05-15 RX ORDER — TEMAZEPAM 15 MG/1
30 CAPSULE ORAL
Status: ON HOLD | COMMUNITY
End: 2018-05-19

## 2018-05-15 RX ORDER — LEVOTHYROXINE SODIUM 88 UG/1
88 TABLET ORAL
Status: DISCONTINUED | OUTPATIENT
Start: 2018-05-15 | End: 2018-05-19 | Stop reason: HOSPADM

## 2018-05-15 RX ORDER — TRAZODONE HYDROCHLORIDE 50 MG/1
100 TABLET ORAL
Status: ON HOLD | COMMUNITY
End: 2018-05-19

## 2018-05-15 RX ORDER — SIMVASTATIN 40 MG/1
20 TABLET, FILM COATED ORAL
Status: DISCONTINUED | OUTPATIENT
Start: 2018-05-15 | End: 2018-05-19 | Stop reason: HOSPADM

## 2018-05-15 RX ORDER — GABAPENTIN 100 MG/1
100 CAPSULE ORAL 3 TIMES DAILY
COMMUNITY

## 2018-05-15 RX ORDER — GABAPENTIN 100 MG/1
300 CAPSULE ORAL DAILY
Status: DISCONTINUED | OUTPATIENT
Start: 2018-05-15 | End: 2018-05-19 | Stop reason: HOSPADM

## 2018-05-15 RX ORDER — LANOLIN ALCOHOL/MO/W.PET/CERES
1000 CREAM (GRAM) TOPICAL DAILY
COMMUNITY

## 2018-05-15 RX ORDER — MIRTAZAPINE 15 MG/1
15 TABLET, FILM COATED ORAL
COMMUNITY

## 2018-05-15 RX ORDER — LANOLIN ALCOHOL/MO/W.PET/CERES
1000 CREAM (GRAM) TOPICAL DAILY
Status: DISCONTINUED | OUTPATIENT
Start: 2018-05-16 | End: 2018-05-15 | Stop reason: SDUPTHER

## 2018-05-15 RX ORDER — AMLODIPINE BESYLATE 5 MG/1
5 TABLET ORAL DAILY
Status: ON HOLD | COMMUNITY
End: 2018-05-19

## 2018-05-15 RX ORDER — TEMAZEPAM 15 MG/1
30 CAPSULE ORAL
Status: DISCONTINUED | OUTPATIENT
Start: 2018-05-15 | End: 2018-05-19 | Stop reason: HOSPADM

## 2018-05-15 RX ORDER — AMLODIPINE BESYLATE 5 MG/1
5 TABLET ORAL DAILY
Status: DISCONTINUED | OUTPATIENT
Start: 2018-05-15 | End: 2018-05-17

## 2018-05-15 RX ORDER — LANOLIN ALCOHOL/MO/W.PET/CERES
1000 CREAM (GRAM) TOPICAL DAILY
Status: DISCONTINUED | OUTPATIENT
Start: 2018-05-15 | End: 2018-05-19 | Stop reason: HOSPADM

## 2018-05-15 RX ORDER — ATENOLOL 25 MG/1
25 TABLET ORAL DAILY
Status: DISCONTINUED | OUTPATIENT
Start: 2018-05-15 | End: 2018-05-17

## 2018-05-15 RX ORDER — ATENOLOL 25 MG/1
25 TABLET ORAL DAILY
Status: ON HOLD | COMMUNITY
End: 2018-05-19

## 2018-05-15 RX ORDER — MIRTAZAPINE 15 MG/1
15 TABLET, FILM COATED ORAL
Status: DISCONTINUED | OUTPATIENT
Start: 2018-05-15 | End: 2018-05-19 | Stop reason: HOSPADM

## 2018-05-15 RX ORDER — ACETAMINOPHEN 325 MG/1
650 TABLET ORAL
Status: DISCONTINUED | OUTPATIENT
Start: 2018-05-15 | End: 2018-05-19 | Stop reason: HOSPADM

## 2018-05-15 RX ORDER — TRAZODONE HYDROCHLORIDE 50 MG/1
100 TABLET ORAL
Status: DISCONTINUED | OUTPATIENT
Start: 2018-05-15 | End: 2018-05-19 | Stop reason: HOSPADM

## 2018-05-15 RX ADMIN — Medication 10 ML: at 14:00

## 2018-05-15 RX ADMIN — GABAPENTIN 300 MG: 100 CAPSULE ORAL at 18:00

## 2018-05-15 RX ADMIN — LEVOTHYROXINE SODIUM 88 MCG: 88 TABLET ORAL at 06:35

## 2018-05-15 RX ADMIN — ATENOLOL 25 MG: 25 TABLET ORAL at 11:20

## 2018-05-15 RX ADMIN — Medication 10 ML: at 05:04

## 2018-05-15 RX ADMIN — Medication 1000 MCG: at 11:20

## 2018-05-15 RX ADMIN — ACETAMINOPHEN 650 MG: 325 TABLET ORAL at 00:31

## 2018-05-15 RX ADMIN — AMLODIPINE BESYLATE 5 MG: 5 TABLET ORAL at 11:20

## 2018-05-15 RX ADMIN — TEMAZEPAM 30 MG: 15 CAPSULE ORAL at 21:18

## 2018-05-15 RX ADMIN — Medication 10 ML: at 21:18

## 2018-05-15 RX ADMIN — TRAZODONE HYDROCHLORIDE 100 MG: 50 TABLET ORAL at 21:17

## 2018-05-15 RX ADMIN — MIRTAZAPINE 15 MG: 15 TABLET, FILM COATED ORAL at 21:18

## 2018-05-15 RX ADMIN — SIMVASTATIN 20 MG: 40 TABLET, FILM COATED ORAL at 21:18

## 2018-05-15 NOTE — PROGRESS NOTES
Problem: Mobility Impaired (Adult and Pediatric)  Goal: *Acute Goals and Plan of Care (Insert Text)  Physical Therapy Goals  Initiated 5/15/2018  1. Patient will move from supine to sit and sit to supine  and scoot up and down in bed with modified independence within 7 day(s). 2.  Patient will transfer from bed to chair and chair to bed with modified independence using the least restrictive device within 7 day(s). 3.  Patient will perform sit to stand with modified independence within 7 day(s). 4.  Patient will ambulate with modified independence for 300 feet with the least restrictive device within 7 day(s). 5.  Patient will improve Fowler Balance score by 7 points within 7 days. physical Therapy EVALUATION- neuro population    Patient: Nba Becerra (52 y.o. female)  Date: 5/15/2018  Primary Diagnosis: Altered mental status  Dehydration  Numbness and tingling of both lower extremities  Inability to walk        Precautions:   Fall, Bed Alarm    ASSESSMENT :  Based on the objective data described below, the patient presents with impaired functional mobility as compared to baseline level 2* STM deficits, intermittent confusion, poor safety awareness, decreased insight to deficits, c/o B foot numbness, slightly impaired L UE gross motor coordination during FTN, impaired balance, and impaired gait following admission for AMS and B LE numbness. CT negative for CVA, MRI pending. Prior to this admission, pt reports that she lived at home alone and was indep with ADLs and mobility w/o use of AD - does admit to having increased difficulty caring for self alone. Received supine in bed- agreeable to participation in session. She was able to mobilize to EOB with SBA. Demos good sitting balance. Completed sit<>stands from various surfaces with CGA for safety 2* impulsivity.  Gait training initiated in hallway and within room with emphasis on dual task integration and head movements - required up to min A with intermittent HHA for stability with noted ?slight ataxia and posterior/R lateral lean that she does not perceive when asked. Limited ability to self correct. Pt remained up in chair at end of session, NAD. Anticipate that she is below her baseline level - question ability return to safely living alone from a cognitive and functional standpoint - pt currently in agreement. Recommending discharge to rehab setting vs home with 24/7 assist and OP PT for balance pending further work up and progress. Will follow. Patient will benefit from skilled intervention to address the above impairments. Patients rehabilitation potential is considered to be Good  Factors which may influence rehabilitation potential include:   []           None noted  []           Mental ability/status  []           Medical condition  [x]           Home/family situation and support systems  [x]           Safety awareness  []           Pain tolerance/management  []           Other:      PLAN :  Recommendations and Planned Interventions:  [x]             Bed Mobility Training             [x]      Neuromuscular Re-Education  [x]             Transfer Training                   []      Orthotic/Prosthetic Training  [x]             Gait Training                         []      Modalities  [x]             Therapeutic Exercises           []      Edema Management/Control  [x]             Therapeutic Activities            [x]      Patient and Family Training/Education  []             Other (comment):  Frequency/Duration: Patient will be followed by physical therapy 5 times a week to address goals. Discharge Recommendations: Rehab and To Be Determined  Further Equipment Recommendations for Discharge: TBD     SUBJECTIVE:   Patient stated I don't know that I can keep living by myself.     OBJECTIVE DATA SUMMARY:   HISTORY:    Past Medical History:   Diagnosis Date    Breast cancer ([facility] [address]     Hypercholesteremia     Neuropathy    No past surgical history on file.  Prior Level of Function/Home Situation: lives at home alone and was mod I with ADLs and mobility w/o use of AD. Personal factors and/or comorbidities impacting plan of care:     Home Situation  Home Environment: Private residence  # Steps to Enter: 0  One/Two Story Residence: One story  Living Alone: Yes  Support Systems: Family member(s)  Patient Expects to be Discharged to[de-identified] Unknown  Current DME Used/Available at Home: None    EXAMINATION/PRESENTATION/DECISION MAKING:   Critical Behavior:  Neurologic State: Alert  Orientation Level: Oriented X4  Cognition: Follows commands, Impulsive, Poor safety awareness  Safety/Judgement: Awareness of environment, Decreased awareness of need for assistance, Decreased awareness of need for safety, Decreased insight into deficits  Hearing: Auditory  Auditory Impairment: None  Skin:  Intact where exposed  Edema: none noted  Range Of Motion:  AROM: Within functional limits     Strength:    Strength: Generally decreased, functional     Tone & Sensation:   Tone: Normal  Sensation: Impaired (reports \"numb\" B feet but able to feel light touch)       Coordination:  Coordination: Generally decreased, functional (slight L UE dysmetria on FTN)  Vision:   Tracking: Able to track stimulus in all quadrants w/o difficulty  Diplopia: No  Functional Mobility:  Bed Mobility:     Supine to Sit: Stand-by assistance     Transfers:  Sit to Stand: Contact guard assistance  Stand to Sit: Contact guard assistance     Balance:   Sitting: Intact  Standing: Impaired; Without support  Standing - Static: Fair  Standing - Dynamic : Fair  Ambulation/Gait Training:  Distance (ft): 150 Feet (ft)  Assistive Device: Gait belt (intermittent HHA)  Ambulation - Level of Assistance: Minimal assistance  Gait Description (WDL): Exceptions to WDL  Gait Abnormalities: Ataxic; Altered arm swing;Decreased step clearance; Path deviations;Trunk sway increased  Base of Support: Narrowed; Shift to right;Center of gravity altered  Speed/Virgie: Slow     Functional Measure  Fowler Balance Test:    Sitting to Standin  Standing Unsupported: 0  Sitting with Back Unsupported: 3  Standing to Sitting: 3  Transfers: 1  Standing Unsupported with Eyes Closed: 0  Standing Unsupported with Feet Together: 0  Reach Forward with Outstretched Arm: 1   Object: 0  Turn to Look Over Shoulders: 2  Turn 360 Degrees: 1  Alternate Foot on Step/Stool: 0  Standing Unsupported One Foot in Front: 0  Stand on One Le  Total: 12         56=Maximum possible score;   0-20=High fall risk  21-40=Moderate fall risk   41-56=Low fall risk     Fowler Balance Test and G-code impairment scale:  Percentage of Impairment CH    0%   CI    1-19% CJ    20-39% CK    40-59% CL    60-79% CM    80-99% CN     100%   Fowler   Score 0-56 56 45-55 34-44 23-33 12-22 1-11 0       G codes: In compliance with CMSs Claims Based Outcome Reporting, the following G-code set was chosen for this patient based on their primary functional limitation being treated: The outcome measure chosen to determine the severity of the functional limitation was the Roman with a score of 1256 which was correlated with the impairment scale.     ? Mobility - Walking and Moving Around:     - CURRENT STATUS: CL - 60%-79% impaired, limited or restricted    - GOAL STATUS: CK - 40%-59% impaired, limited or restricted    - D/C STATUS:  ---------------To be determined---------------     Physical Therapy Evaluation Charge Determination   History Examination Presentation Decision-Making   HIGH Complexity :3+ comorbidities / personal factors will impact the outcome/ POC  MEDIUM Complexity : 3 Standardized tests and measures addressing body structure, function, activity limitation and / or participation in recreation  LOW Complexity : Stable, uncomplicated  HIGH Complexity : FOTO score of 1- 25       Based on the above components, the patient evaluation is determined to be of the following complexity level: LOW     Pain:  Pain Scale 1: Numeric (0 - 10)  Pain Intensity 1: 0      Activity Tolerance:   NAD    Please refer to the flowsheet for vital signs taken during this treatment. After treatment:   [x]     Patient left in no apparent distress sitting up in chair  []     Patient left in no apparent distress in bed  [x]     Call bell left within reach  [x]     Nursing notified  []     Caregiver present  [x]     Chair alarm activated    COMMUNICATION/EDUCATION:   The patients plan of care was discussed with: Occupational Therapist and Registered Nurse. Patient was educated regarding Her deficit(s) of impaired balance and B LE numbness as this relates to Her diagnosis of TIA vs CVA. She demonstrated Good understanding as evidenced by nodding. Patient and/or family was verbally educated on the BE FAST acronym for signs/symptoms of CVA and TIA. BE FAST was written on patient's communication board  for visual education and reinforcement. All questions answered with patient indicating good understanding. [x]  Fall prevention education was provided and the patient/caregiver indicated understanding. [x]  Patient/family have participated as able in goal setting and plan of care. [x]  Patient/family agree to work toward stated goals and plan of care. []  Patient understands intent and goals of therapy, but is neutral about his/her participation. []  Patient is unable to participate in goal setting and plan of care.     Thank you for this referral.  Sadia Roberts, PT, DPT   Time Calculation: 21 mins

## 2018-05-15 NOTE — PROGRESS NOTES
Primary Nurse Marizol Greenberg RN and Marco Antonio Keen RN performed a dual skin assessment on this patient No impairment noted  Garth score is 20

## 2018-05-15 NOTE — PROGRESS NOTES
Problem: Self Care Deficits Care Plan (Adult)  Goal: *Acute Goals and Plan of Care (Insert Text)  Occupational Therapy Goals  Initiated 5/15/2018   1. Patient will perform grooming with supervision/set-up standing >3 minutes within 7 day(s). 2.  Patient will perform lower body dressing with modified independence within 7 day(s). 3.  Patient will perform toilet transfers with supervision/set-up to toilet within 7 day(s). 4.  Patient will perform all aspects of toileting with supervision/set-up within 7 day(s). 5.  Patient will participate in upper extremity therapeutic exercise/activities with independence for 5 minutes within 7 day(s). 6.  Patient will utilize energy conservation and fall prevention techniques during functional activities with verbal cues within 7 day(s). Occupational Therapy EVALUATION  Patient: Chevy Mcgovern (3 y.o. female)  Date: 5/15/2018  Primary Diagnosis: Altered mental status  Dehydration  Numbness and tingling of both lower extremities  Inability to walk        Precautions:  Fall    ASSESSMENT :  Cleared by RN to see pt for therapy session. Based on the objective data described below, the patient presents with decreased balance, decreased sensation to bilateral feet and impulsivity and decreased safety awareness following admission for AMS and inability to walk. . CT negative for acute processes, MRI pending. Pt is from [place] (was visiting son in [facility]), lives alone and is previously I with ADLs, IADLs and functional mobility. Pt received supine in bed, alert and oriented x4, agreeable to participate. Reports numbness in feet although is able to feel light touch and distinguish temperature. Performed bed mobility with standby assist, good sitting balance at EOB. Performed Fugl Rueda UE assessment with pt and she scored 66/66 bilaterally indicating no UE impairment. Pt able to don/doff socks using leg crossover technique.  Pt stood from bedside with min A and performed near transfer to [facility]. Pt unsteady and impulsive with transfer, hand held and assist and gait belt for steadying. Returned to supine at end of session, call bell in reach and needs met. She will benefit from continued OT to address the above deficits. At this time pt requires assistance for mobility and standing ADLs due to decreased balance and safety awareness. Pending progress, recommend rehab at discharge to increase independence and safety prior to returning home. Patient will benefit from skilled intervention to address the above impairments. Patients rehabilitation potential is considered to be Good  Factors which may influence rehabilitation potential include:   [x]                None noted  []                Mental ability/status  []                Medical condition  []                Home/family situation and support systems  []                Safety awareness  []                Pain tolerance/management  []                Other:      PLAN :  Recommendations and Planned Interventions:  [x]                  Self Care Training                  [x]           Therapeutic Activities  [x]                  Functional Mobility Training    []           Cognitive Retraining  [x]                  Therapeutic Exercises           [x]           Endurance Activities  [x]                  Balance Training                   [x]           Neuromuscular Re-Education  []                  Visual/Perceptual Training     [x]      Home Safety Training  [x]                  Patient Education                 [x]           Family Training/Education  []                  Other (comment):    Frequency/Duration: Patient will be followed by occupational therapy 5 times a week to address goals. Discharge Recommendations: Rehab pending progress  Further Equipment Recommendations for Discharge: TBD at rehab     SUBJECTIVE:   Patient stated My legs just gave way and I fell.     OBJECTIVE DATA SUMMARY:   HISTORY:   Past Medical History:   Diagnosis Date    Breast cancer ([facility] [address]     Hypercholesteremia     Neuropathy    No past surgical history on file. Prior Level of Function/Environment/Context: Pt is from [place] (was visiting son in [facility]), lives alone and is previously I with ADLs, IADLs and functional mobility. Home Situation  Home Environment: Private residence  # Steps to Enter: 0  One/Two Story Residence: One story  Living Alone: Yes  Support Systems: Family member(s), Child(sampson)  Patient Expects to be Discharged to[de-identified] Unknown  Current DME Used/Available at Home: None  Tub or Shower Type: Tub/Shower combination  [x]  Right hand dominant   []  Left hand dominant    EXAMINATION OF PERFORMANCE DEFICITS:  Cognitive/Behavioral Status:  Neurologic State: Alert  Orientation Level: Oriented X4  Cognition: Follows commands; Impulsive  Perception: Appears intact  Perseveration: No perseveration noted  Safety/Judgement: Awareness of environment;Decreased awareness of need for assistance;Decreased awareness of need for safety;Decreased insight into deficits; Fall prevention    Hearing: Auditory  Auditory Impairment: None    Vision/Perceptual:    Tracking: Able to track stimulus in all quadrants w/o difficulty                 Diplopia: No    Acuity: Able to read clock/calendar on wall without difficulty; Able to read employee name badge without difficulty    Corrective Lenses: Glasses    Range of Motion:  AROM: Within functional limits          Strength:  Strength: Generally decreased, functional     Coordination:  Coordination: Generally decreased, functional (slight L UE dysmetria on FTN)            Tone & Sensation:  Tone: Normal  Sensation: Impaired (numb bilateral feet)          Balance:  Sitting: Intact  Standing: Impaired; With support (gait belt and hand held)  Standing - Static: Fair  Standing - Dynamic : Fair    Functional Mobility and Transfers for ADLs:  Bed Mobility:  Supine to Sit: Stand-by assistance  Sit to Supine: Stand-by assistance    Transfers:  Sit to Stand: Minimum assistance  Functional Transfers  Toilet Transfer : Minimum assistance        ADL Assessment:  Feeding: Independent    Oral Facial Hygiene/Grooming: Setup    Bathing: Minimum assistance; Additional time    Upper Body Dressing: Setup    Lower Body Dressing: Moderate assistance    Toileting: Minimum assistance     ADL Intervention and task modifications:   Provided verbal/tactile cues for safe transfer technique to [facility]. Instructed pt to call for assistance prior to transferring for fall prevention. Lower Body Dressing Assistance  Socks: Contact guard assistance    Toileting  Bladder Hygiene: Contact guard assistance (sitting)    Cognitive Retraining  Safety/Judgement: Awareness of environment;Decreased awareness of need for assistance;Decreased awareness of need for safety;Decreased insight into deficits; Fall prevention    Functional Measure:   Fugl-Rueda Assessment of Motor Recovery after Stroke:     Reflex Activity  Flexors/Biceps/Fingers: Can be elicited  Extensors/Triceps: Can be elicited  Reflex Subtotal: 4    Volitional Movement Within Synergies  Shoulder Retraction: Full  Shoulder Elevation: Full  Shoulder Abduction (90 degrees): Full  Shoulder External Rotation: Full  Elbow Flexion: Full  Forearm Supination: Full  Shoulder Adduction/Internal Rotation: Full  Elbow Extension: Full  Forearm Pronation: Full  Subtotal: 18    Volitional Movement Mixing Synergies  Hand to Lumbar Spine: Full  Shoulder Flexion (0-90 degrees): Full  Pronation-Supination: Full  Subtotal: 6    Volitional Movement With Little or No Synergy  Shoulder Abduction (0-90 degrees): Full  Shoulder Flexion ( degrees): Full  Pronation/Supination: Full  Subtotal : 6    Normal Reflex Activity  Biceps, Triceps, Finger Flexors:  Full  Subtotal : 2    Upper Extremity Total   Upper Extremity Total: 36    Wrist  Stability at 15 Degree Dorsiflexion: Full  Repeated Dorsiflexion/ Volar Flexion: Full  Stability at 15 Degree Dorsiflexion: Full  Repeated Dorsiflexion/ Volar Flexion: Full  Circumduction: Full  Wrist Total: 10    Hand  Mass Flexion: Full  Mass Extension: Full  Grasp A: Full  Grasp B: Full  Grasp C: Full  Grasp D: Full  Grasp E: Full  Hand Total: 14    Coordination/Speed  Tremor: None  Dysmetria: None  Time: <1s  Coordination/Speed Total : 6    Total A-D  Total A-D (Motor Function): 66/66     Percentage of impairment CH  0% CI  1-19% CJ  20-39% CK  40-59% CL  60-79% CM  80-99% CN  100%   Fugl-Rueda score: 0-66 66 53-65 39-52 26-38 13-25 1-12   0      This is a reliable/valid measure of arm function after a neurological event. It has established value to characterize functional status and for measuring spontaneous and therapy-induced recovery; tests proximal and distal motor functions. Fugl-Rueda Assessment  UE scores recorded between five and 30 days post neurologic event can be used to predict UE recovery at six months post neurologic event. Severe = 0-21 points   Moderately Severe = 22-33 points   Moderate = 34-47 points   Mild = 48-66 points  JONATHAN Lopez, SPENCER Hill, & CATRINA Laboy (1992). Measurement of motor recovery after stroke: Outcome assessment and sample size requirements.  Stroke, 23, pp. 9380-9557.   ------------------------------------------------------------------------------------------------------------------------------------------------------------------  MCID:  Stroke:   Celeste Penaloza, 2001; n = 171; mean age 79 (5) years; assessed within 16 (12) days of stroke, Acute Stroke)  FMA Motor Scores from Admission to Discharge   10 point increase in FMA Upper Extremity = 1.5 change in discharge FIM   10 point increase in FMA Lower Extremity = 1.9 change in discharge FIM  MDC:   Stroke:   Marnie West et al, 2008, n = 14, mean age = 59.9 (14.6) years, assessed on average 14 (6.5) months post stroke, Chronic Stroke)   FMA = 5.2 points for the Upper Extremity portion of the assessment     Barthel Index:    Bathin  Bladder: 5  Bowels: 10  Groomin  Dressin  Feeding: 10  Mobility: 5  Stairs: 0  Toilet Use: 5  Transfer (Bed to Chair and Back): 10  Total: 55       Barthel and G-code impairment scale:  Percentage of impairment CH  0% CI  1-19% CJ  20-39% CK  40-59% CL  60-79% CM  80-99% CN  100%   Barthel Score 0-100 100 99-80 79-60 59-40 20-39 1-19   0   Barthel Score 0-20 20 17-19 13-16 9-12 5-8 1-4 0      The Barthel ADL Index: Guidelines  1. The index should be used as a record of what a patient does, not as a record of what a patient could do. 2. The main aim is to establish degree of independence from any help, physical or verbal, however minor and for whatever reason. 3. The need for supervision renders the patient not independent. 4. A patient's performance should be established using the best available evidence. Asking the patient, friends/relatives and nurses are the usual sources, but direct observation and common sense are also important. However direct testing is not needed. 5. Usually the patient's performance over the preceding 24-48 hours is important, but occasionally longer periods will be relevant. 6. Middle categories imply that the patient supplies over 50 per cent of the effort. 7. Use of aids to be independent is allowed. Alexa Blackwell., Barthel, D.W. ([id number]). Functional evaluation: the Barthel Index. [address] [facility] (14)2. Judy Brush, JUAN.JDinorahMNEEL, Linnette Vasquez., Charis Apley.[facility], [address] [place] [address] (). Measuring the change indisability after inpatient rehabilitation; comparison of the responsiveness of the Barthel Index and Functional [place] Measure. Journal of Neurology, Neurosurgery, and Psychiatry, 66(4), 336-514. Leland Lennox, [place].EMMANUEL, JOJO Chirinos, & Aundrea Carrero MDinorahA. (2004.) Assessment of post-stroke quality of life in cost-effectiveness studies: The usefulness of the Barthel Index and the EuroQoL-5D. Quality of Life Research, 13, 763-91     G codes: In compliance with CMSs Claims Based Outcome Reporting, the following G-code set was chosen for this patient based on their primary functional limitation being treated: The outcome measure chosen to determine the severity of the functional limitation was the Barthel Index with a score of 55/100 which was correlated with the impairment scale. ? Self Care:     - CURRENT STATUS: CK - 40%-59% impaired, limited or restricted    - GOAL STATUS: CI - 1%-19% impaired, limited or restricted    - D/C STATUS:  ---------------To be determined---------------      Occupational Therapy Evaluation Charge Determination   History Examination Decision-Making   LOW Complexity : Brief history review  MEDIUM Complexity : 3-5 performance deficits relating to physical, cognitive , or psychosocial skils that result in activity limitations and / or participation restrictions MEDIUM Complexity : Patient may present with comorbidities that affect occupational performnce. Miniml to moderate modification of tasks or assistance (eg, physical or verbal ) with assesment(s) is necessary to enable patient to complete evaluation       Based on the above components, the patient evaluation is determined to be of the following complexity level: LOW     Pain:  Pain Scale 1: Numeric (0 - 10)  Pain Intensity 1: 0              Activity Tolerance:   Good  Please refer to the flowsheet for vital signs taken during this treatment. After treatment:   []  Patient left in no apparent distress sitting up in chair  [x]  Patient left in no apparent distress in bed  [x]  Call bell left within reach  [x]  Nursing notified  []  Caregiver present  [x]  Bed alarm activated    COMMUNICATION/EDUCATION:   The patients plan of care was discussed with: Physical Therapist and Registered Nurse. [x]      Home safety education was provided and the patient/caregiver indicated understanding.   [x] Patient/family have participated as able and agree with findings and recommendations. []      Patient is unable to participate in plan of care at this time. This patients plan of care is appropriate for delegation to MANNY.     Thank you for this referral.  Zayra Clark OT  Time Calculation: 23 mins

## 2018-05-15 NOTE — ED NOTES
Bedside and Verbal shift change report given to Bria Jasso RN (oncoming nurse) by [facility] RN (offgoing nurse). Report included the following information ED Summary. 8:13 PM Patient assisted to use bedpan; able to lift hips independently. Patient voided 500 mL of clear yellow urine. Patient assisted into a position of comfort, eating dinner without difficulty. Family at bedside. 9:13 PM MD Lei (Hospitalist) at bedside to evaluate patient. 9:20 PM Patient assisted to use bedpan without issue. Labs drawn per orders. Patient and family updated on room assignment and process for admission. 9:34 PM Patient left department with Tech on cardiac monitor for transportation to inpatient bed. Patient's VS at the time of transfer were /56, 95% on Room Air, denies pain at this time, 98.1 orally, HR 76, NSR rhythm on the monitor. Patient was alert and oriented x 3 and denies further needs at time of transfer. Patient's family went home prior to transfer to floor. TRANSFER - OUT REPORT:    Verbal report given to Haja(name) priscilla Ames  being transferred to NSTU(unit) for routine progression of care       Report consisted of patients Situation, Background, Assessment and   Recommendations(SBAR). Information from the following report(s) SBAR, Kardex, ED Summary, [facility] and Recent Results was reviewed with the receiving nurse. Opportunity for questions and clarification was provided.

## 2018-05-15 NOTE — PROGRESS NOTES
Bedside shift change report given to Kacy (oncoming nurse) by Sukhi Connolly (offgoing nurse). Report included the following information SBAR, Kardex, Procedure Summary, MAR, Accordion, Recent Results and Cardiac Rhythm NSR.

## 2018-05-15 NOTE — PROGRESS NOTES
Hospitalist Progress Note  Rafael White MD  Answering service: [phone] OR [phone] from in house phone        Date of Service:  5/15/2018  NAME:  Myrna Harvey  :  1935  MRN:  [id number]      Admission Summary:   An 80-year-old white female with past medical history of peripheral neuropathy, breast cancer status post bilateral mastectomy, hyperlipidemia, presented to the emergency department from home with altered mental status and chief complaint of bilateral leg and feet numbness. She has not complained of any new onset blurred vision, diplopia, slurred speech, neck pain, back pain, chest pain, shortness of breath, cough, congestion, abdominal pain, nausea, vomiting, diarrhea, melena, dysuria, hematuria or bowel or bladder incontinence calf pain, swelling, fever, chills, rash, head and neck trauma or falls. Her son and daughter-in-law later note that the patient has poor recollection of the events from earlier today. He notes that her symptoms were severe such that she was slumped over after eating a sandwich. There are no reports of any choking. There are no reports of any respiratory difficulty. He notes that rather she had loss of memory and amnesia of events that occurred this afternoon. He notes this is a definite change compared to her baseline. Evaluation in the emergency department for vital signs of blood pressure 125/72, heart rate 107, respirations 16, saturation 97% on room air. Labs showed a BUN of 23, creatinine 1.07. CT:  Head without contrast, atrophy, significant small vessel ischemic changes with a focal area of chronic ischemia in the left cerebellum with no acute abnormalities. Code stroke had been called. Interval history / Subjective:     Patient seen and examined this morning; sitting up on chair. Awake, alert and oriented to time/place/person. States still has b/l LE numbness and weakness.     Denies headache, palpitation, double or blurred vision, dizziness. Assessment & Plan:     #. Altered mental status; resolved now, unclear cause  -Metabolic encephalopathy, related to dehydration vs TIA. Stroke w/u on going  -Fall precaution, neuro check    #. Bilateral LE weakness associated with numbness;  -Probably related to severe peripheral neuropathy, on physical exam noted decreased LE sensation but the motor strength is good. -According to the pt, this has been going on for more than 2 months.  -Gabapentin not helping much. -Vit B12 WNL, check Vit D, copper and MMA  -Continue PT/OT  -If MRI brain is negative, consider MRI spine. #. Dehydration: continue gentle IV hydration    #. HTN/HLD; resume home meds    #. Moderate Chronic Protein Calorie Malnutrition; nutritionist consult   #. Insomnia: continue prn home meds for sleep  Code status: Full  DVT prophylaxis: SCD    Care Plan discussed with: Patient/Family and Nurse  Disposition: TBD     Hospital Problems  Date Reviewed: 5/14/2018          Codes Class Noted POA    Dehydration ICD-10-CM: E86.0  ICD-9-CM: 276.51  5/14/2018 Unknown        * (Principal)Altered mental status ICD-10-CM: R41.82  ICD-9-CM: 780.97  5/14/2018 Unknown        Inability to walk ICD-10-CM: R26.2  ICD-9-CM: 719.7  5/14/2018 Unknown        Numbness and tingling of both lower extremities ICD-10-CM: R20.0, R20.2  ICD-9-CM: 782.0  5/14/2018 Unknown                Review of Systems:   A comprehensive review of systems was negative except for that written in the HPI. Vital Signs:    Last 24hrs VS reviewed since prior progress note.  Most recent are:  Visit Vitals    /68 (BP 1 Location: Right arm, BP Patient Position: At rest)    Pulse 81    Temp 98.2 °F (36.8 °C)    Resp 18    Ht 5' 2\" (1.575 m)    Wt 45 kg (99 lb 4.8 oz)    SpO2 94%    BMI 18.16 kg/m2         Intake/Output Summary (Last 24 hours) at 05/15/18 1635  Last data filed at 05/14/18 2226   Gross per 24 hour   Intake 122.92 ml   Output             1200 ml   Net         -1077.08 ml        Physical Examination:             Constitutional:  No acute distress, cooperative, pleasant    ENT:  Oral mucous moist, oropharynx benign. Neck supple,    Resp:  CTA bilaterally. No wheezing/rhonchi/rales. No accessory muscle use   CV:  Regular rhythm, normal rate, no murmurs, gallops, rubs    GI:  Soft, non distended, non tender. normoactive bowel sounds, no hepatosplenomegaly     Musculoskeletal:  No edema, warm, 2+ pulses throughout    Neurologic:  Moves all extremities; decreased b/l LE sensation. AAOx3, CN II-XII reviewed     Psych:  Good insight, Not anxious nor agitated. Data Review:    Review and/or order of clinical lab test  Review and/or order of tests in the radiology section of [facility]      Labs:     Recent Labs      05/15/18   0248  05/14/18   1859   WBC  8.7  9.6   HGB  10.7*  11.7   HCT  32.3*  35.5   PLT  193  216     Recent Labs      05/15/18   0248  05/14/18   1859   NA  144  141   K  3.7  3.9   CL  112*  107   CO2  27  28   BUN  18  23*   CREA  0.81  1.07*   GLU  103*  86   CA  8.3*  8.7   MG  1.9   --    PHOS  2.8   --      Recent Labs      05/14/18 1859   SGOT  18   ALT  16   AP  56   TBILI  0.3   TP  6.7   ALB  3.7   GLOB  3.0     Recent Labs      05/14/18 1859 05/14/18   1845   INR  1.0  0.9   PTP  9.9   --    APTT  25.9   --       No results for input(s): FE, TIBC, PSAT, FERR in the last 72 hours. No results found for: FOL, RBCF   No results for input(s): PH, PCO2, PO2 in the last 72 hours.   Recent Labs      05/14/18 1859   TROIQ  <0.04     No results found for: CHOL, CHOLX, CHLST, CHOLV, HDL, LDL, LDLC, DLDLP, TGLX, TRIGL, TRIGP, CHHD, CHHDX  Lab Results   Component Value Date/Time    Glucose (POC) 94 05/14/2018 06:43 PM     Lab Results   Component Value Date/Time    Color YELLOW/STRAW 05/14/2018 07:07 PM    Appearance CLEAR 05/14/2018 07:07 PM    Specific gravity <1.005 05/14/2018 07:07 PM    pH (UA) 5.5 05/14/2018 07:07 PM    Protein NEGATIVE  05/14/2018 07:07 PM    Glucose NEGATIVE  05/14/2018 07:07 PM    Ketone NEGATIVE  05/14/2018 07:07 PM    Bilirubin NEGATIVE  05/14/2018 07:07 PM    Urobilinogen 0.2 05/14/2018 07:07 PM    Nitrites NEGATIVE  05/14/2018 07:07 PM    Leukocyte Esterase NEGATIVE  05/14/2018 07:07 PM    Epithelial cells FEW 05/14/2018 07:07 PM    Bacteria NEGATIVE  05/14/2018 07:07 PM    WBC 0-4 05/14/2018 07:07 PM    RBC 0-5 05/14/2018 07:07 PM         Medications Reviewed:     Current Facility-Administered Medications   Medication Dose Route Frequency    acetaminophen (TYLENOL) tablet 650 mg  650 mg Oral Q6H PRN    atenolol (TENORMIN) tablet 25 mg  25 mg Oral DAILY    levothyroxine (SYNTHROID) tablet 88 mcg  88 mcg Oral ACB    amLODIPine (NORVASC) tablet 5 mg  5 mg Oral DAILY    cyanocobalamin (VITAMIN B12) tablet 1,000 mcg  1,000 mcg Oral DAILY    gabapentin (NEURONTIN) capsule 100 mg  100 mg Oral TID    mirtazapine (REMERON) tablet 15 mg  15 mg Oral QHS    simvastatin (ZOCOR) tablet 20 mg  20 mg Oral QHS    temazepam (RESTORIL) capsule 30 mg  30 mg Oral QHS PRN    traZODone (DESYREL) tablet 100 mg  100 mg Oral QHS    sodium chloride (NS) flush 5-10 mL  5-10 mL IntraVENous Q8H    sodium chloride (NS) flush 5-10 mL  5-10 mL IntraVENous PRN    0.9% sodium chloride infusion  75 mL/hr IntraVENous CONTINUOUS    nicotine (NICODERM CQ) 21 mg/24 hr patch 1 Patch  1 Patch TransDERmal DAILY     ______________________________________________________________________  EXPECTED LENGTH OF STAY: 2d 2h  ACTUAL LENGTH OF STAY:          1                 Joel Fonseca MD

## 2018-05-15 NOTE — PROGRESS NOTES
Reason for Admission: Patient presented with numbness and tingling in the legs                      RRAT Score: 5                    Plan for utilizing home health: TBD- CM will await PT/OT evaluation. Currently the family has concerns regarding patient's ability to live alone- the patient lives in [place] independently, however, family verbalized concerns regarding patient's mental status (family states she has undiagnosed dementia). CM will await recommendations- patient has limited supports in [place], has one son Kelli Cortez that lives in [facility]. Unclear at this time if patient will discharge to [place] vs. Family home- family has numerous concerns of ability to care for patient. Since patient lives in [place], [facility] may not be able to screen patient for Medicaid since patient is not a resident of [place]. Will reach out to inquire. Likelihood of Readmission: High                          Transition of Care Plan:  TBD- CM spoke extensively with the patient's two sons, Kelli Cortez and Ignacio Padgett ([phone]) via phone. The family has numerous concerns regarding patient's ability to care for self- family endorses patient have 3x falls over the weekend, forgetfulness, etc.     The CM met with patient at bedside in order to introduce the role of CM and assess for patient needs. The patient is a limited historian- unable to recall why she was in [place], after prompting able to say she was visiting her son Kelli Cortez who lives locally in [place], however, unable to discuss disposition. The CM spoke with patient's son Kelli Cortez, and patient was visiting over the weekend due to numerous ED visits in [place]- family endorses numerous falls and confusion. Kelli Cortez reports that patient fell 3x yesterday. The patient lives alone in own home in [place]- patient has limited familial supports nearby.  The patient's son Marcelo Allred is MPOA (not on file, however, family endorses giving to MD in ER) lives in [place], however, resides approximately 80 minutes away from patient. Per discussions with Vidal Gadrner and Jazmin Zhou, there are numerous concerns regarding patient's cognitive status and ability to live alone. CM updated PT- will await recommendations. Patient at baseline is walking independently, family endorses taking away keys for vehicle recently due to concerns of cognitive status. Patient endorses having PCP in [place], Dr. Xiomara Villafana. CM will continue to follow. MARTIN Bailey    Care Management Interventions  PCP Verified by CM: Yes (Patient stated that her PCP is Dr. Xiomara Villafana in [place])  Mode of Transport at Discharge:  Other (see comment) (Anticipate family transport)  Transition of Care Consult (CM Consult): Discharge Planning  MyChart Signup: No  Discharge Durable Medical Equipment: No  Health Maintenance Reviewed: Yes  Physical Therapy Consult: Yes  Occupational Therapy Consult: Yes  Speech Therapy Consult: Yes  Current Support Network: Own Home, Lives Alone (Patient lives alone in own home in [place]- family has numerous concerns regarding patient's mental status and ability to live independently)  Confirm Follow Up Transport: Family  Plan discussed with Pt/Family/Caregiver: Yes  [place] Resource Information Provided?: No  Discharge Location  Discharge Placement: Unable to determine at this time

## 2018-05-15 NOTE — PROGRESS NOTES
Problem: Falls - Risk of  Goal: *Absence of Falls  Document Marcela Fall Risk and appropriate interventions in the flowsheet. Outcome: Progressing Towards Goal  Fall Risk Interventions:  Mobility Interventions: Communicate number of staff needed for ambulation/transfer, Bed/chair exit alarm, OT consult for ADLs, Patient to call before getting OOB, PT Consult for mobility concerns    Mentation Interventions: Bed/chair exit alarm, Adequate sleep, hydration, pain control, Door open when patient unattended, Increase mobility, More frequent rounding, Room close to nurse's station, Reorient patient, Update white board         Elimination Interventions: Bed/chair exit alarm, Call light in reach, Patient to call for help with toileting needs, Toileting schedule/hourly rounds             Problem: Pressure Injury - Risk of  Goal: *Prevention of pressure injury  Document Garth Scale and appropriate interventions in the flowsheet. Outcome: Progressing Towards Goal  Pressure Injury Interventions:             Activity Interventions: Increase time out of bed, Pressure redistribution bed/mattress(bed type), PT/OT evaluation    Mobility Interventions: HOB 30 degrees or less, Pressure redistribution bed/mattress (bed type), PT/OT evaluation    Nutrition Interventions: Document food/fluid/supplement intake    Friction and Shear Interventions: HOB 30 degrees or less, Lift sheet

## 2018-05-15 NOTE — H&P
[address]  HISTORY AND PHYSICAL      Herb Enrike  MR#: [id number]  : 1935  ACCOUNT #: [de-identified]   ADMIT DATE: 2018    CHIEF COMPLAINT:  Numbness. HISTORY OF PRESENT ILLNESS:  An 80-year-old white female with past medical history of peripheral neuropathy, breast cancer status post bilateral mastectomy, hyperlipidemia, presented to the emergency department from home with altered mental status and chief complaint of bilateral leg and feet numbness. The patient is a limited historian. She is accompanied by her son and daughter-in-law. For the collective report, the patient reported onset of altered mental status starting approximately 1330 hours today. She has a known history of peripheral neuropathy. Has been on Neurontin for the same. Normally at a dose of 100 mg p.o. t.i.d. According to her son, she lives in [place] alone performing activities of daily living unassisted and usually ambulates without assistance. He notes that his brother had taken the patient to a hospital in [facility] on 2018. She was having worsening of her numbness in the lower extremities associated with pain that was progressive. He notes no workup such as CT of the head or other imaging was performed and the patient was discharged home with the increased dose of her Neurontin to 300 mg p.o. once a day (as opposed to 100 mg p.o. t.i.d. She reportedly ran out of her 100 mg t.i.d. dose tabs and took a 300 mg once a day tab. Today, she presented to the ED as she has been unable to walk with severe, profound worsening of her numbness and weakness of bilateral lower extremities. She was reportedly unable to support herself or bear weight. She had generalized weakness. She does not report pain today. She admits to having a headache, which she notes has been more or less intermittent and chronic.   She has not complained of any new onset blurred vision, diplopia, slurred speech, neck pain, back pain, chest pain, shortness of breath, cough, congestion, abdominal pain, nausea, vomiting, diarrhea, melena, dysuria, hematuria or bowel or bladder incontinence calf pain, swelling, fever, chills, rash, head and neck trauma or falls. Her son and daughter-in-law later note that the patient has poor recollection of the events from earlier today. He notes that her symptoms were severe such that she was slumped over after eating a sandwich. There are no reports of any choking. There are no reports of any respiratory difficulty. He notes that rather she had loss of memory and amnesia of events that occurred this afternoon. He notes this is a definite change compared to her baseline. Evaluation in the emergency department for vital signs of blood pressure 125/72, heart rate 107, respirations 16, saturation 97% on room air. Labs showed a BUN of 23, creatinine 1.07. CT:  Head without contrast, atrophy, significant small vessel ischemic changes with a focal area of chronic ischemia in the left cerebellum with no acute abnormalities. Code stroke had been called. The patient is now seen for admission to the hospitalist service for continued evaluation and treatment. The patient reportedly has no prior history of stroke. PAST MEDICAL HISTORY:  1. Breast cancer. 2.  Hyperlipidemia. 3.  Hypertension. 4.  Hypothyroidism. 5.  Insomnia. 6.  Peripheral neuropathy. 7.  Hypothyroidism. PAST SURGICAL HISTORY:  Status post bilateral mastectomies. MEDICATIONS:  List provided to the patient's son. Temazepam 15 mg p.o. q. Daily  Simvastatin 20 mg p.o. q. Daily  Trazodone 50 mg p.o. q. Daily  Atenolol 25 mg p.o. q. Daily  Levothyroxine 88 mcg p.o. Daily  Mirtazapine 15 mg p.o. q. Daily  Amlodipine 5 mg p.o. Daily  Vitamin B12 1000 mg p.o. q. Daily  Gabapentin 300 mg p.o. q. daily new dose, old dose 100 mg p.o. t.i.d. ALLERGIES:  NO KNOWN DRUG ALLERGIES.      SOCIAL HISTORY: Positive smoking cigarettes 1-1/2 packs per day. Positive for alcohol, rare. Normally lives alone, ambulates unassisted. FAMILY HISTORY:  Unknown regards heart attack, strokes. REVIEW OF SYSTEMS:  Pertinent positives as noted in HPI. All other systems were reviewed and negative. PHYSICAL EXAMINATION:  VITAL SIGNS:  Temperature 97.9 degrees Fahrenheit, blood pressure 126/55, heart rate 75, respiration rate of 15, O2 saturation 95% on room air, recorded weight of 109 pounds  (49.7 kg) recorded height of 5 feet 2 inches tall, BMI of 19.9. GENERAL:  Underweight elderly female in no acute respiratory distress. PSYCHIATRIC: The patient is awake, alert and oriented x 3. NEUROLOGIC:  GCS of 15 best response. Moving extremities x 4 with generalized weakness. Sensation slightly decreased in the plantar surface of both feet without slurred speech, facial droop, and no pronator drift. HEENT:  Normocephalic, atraumatic. PERRLA. EOMs intact. Sclerae are anicteric. Conjunctivae clear. Nares are patent. Oropharynx is clear. Tongue is midline, nonedematous. NECK:  Supple, without lymphadenopathy, JVD, carotid bruits, thyromegaly. LYMPH:  Negative for supraclavicular or cervical lymphadenopathy. LUNGS:  Clear to auscultation bilaterally. HEART:  Regular rate and rhythm, normal S1, S2, without murmurs, rubs or gallops. ABDOMEN:  Soft, nontender, nondistended. Normoactive bowel sounds. No rebound,  guarding or rigidity. No auscultated abdominal bruits. No palpated mass. BACK:  No CVA tenderness. No step-off deformity. Positive kyphosis. MUSCULOSKELETAL:  No acute palpable bony deformity. Negative for calf tenderness. VASCULAR:  2+ radial 1+ dorsalis pedis pulses. EXTREMITIES:  No cyanosis. Positive for clubbing. Negative for edema. SKIN:  Warm and dry. Superficial old abrasion on the left knee. LABORATORY DATA:  Reviewed.   Sodium 141, potassium 3.9, chloride 107, CO2 of 28, BUN of 23, creatinine 1.07, glucose of 86, anion gap of 6, calcium is 8.7, GFR 49, total bilirubin 0.3, total protein 6.7, albumin is 3.7, ALT of 16, AST of 18, alkaline phosphatase 56. Troponin I less than 0.04. WBC of 9.6, hemoglobin 11.7, hematocrit 35.5, platelets 144, neutrophils are 57%. Urinalysis:  Leukoesterase negative, nitrites negative, urobilinogen 0.2 bilirubin negative, ketones negative, glucose negative, protein negative, pH 5.5, specific gravity less than 1.005, WBCs 0-4, RBCS 0-5, bacteria negative. INR 1.0, PT of 9.9 PTT 25.9. CT:  Narrow head without contrast with results noted in HPI. Chest x-ray, PA and lateral, no acute process. A 12 lead EKG normal sinus rhythm at 74 beats per minute. ASSESSMENT AND PLAN:  1. Altered mental status. Admit patient to neuro/stroke floor. Placed neurovascular checks, fall precautions. Consult with neurologist.  Consideration for possible transient ischemic attack, rule out stroke. Ordered MRI of the brain, bilateral carotid Doppler, 2D echocardiogram, lipid panel. Consult neurologist and consult with physical and occupational therapist.    2. Transient ischemic attack -- rule out stroke. At this time, we will continue workup and plan as noted above. Also, family were concerned for possible dementia. She has had progression of her symptoms such that the family brought the patient to [place] as they felt like she was unsafe to live at home in [place] alone. Continue workup as noted above. Consider for dementia workup and follow with case management for discharge planning. 3.  Inability to walk. Plan as noted above. 4. Bilateral lower extremity weakness and as noted above bilateral extremity numbness with chronic peripheral neuropathy. The 300 mg p.o. once daily dosing may be too much for the patient at one time. We talked with neurologist and if restarted revert back to her old dosing of 100 mg p.o. t.i.d.  5. Dehydration.  We will order 0.9% normal saline 500 mL IV fluid bolus. Followed by IV fluid maintenance 125 mL per hour. Repeat the renal panel in the a.m.  6. Underweight consult with nutritionist.  7.  Hyperlipidemia. Check lipid panel. Continue on simvastatin. 8.  Hypertension. Resume back home medication. Venous thromboembolism prophylaxis. 9.  Tobacco abuse. Encouraged smoking cessation or nicotine patch. 10.  Deep venous thrombosis prophylaxis. Sequential compressive devices lower extremities     CODE STATUS:  I discussed advanced directives code status with the patient, she requests FULL CODE status. MD AMELIA Ruiz/DAYLIN  D: 05/14/2018 21:45     T: 05/14/2018 23:00  JOB #: [id number]

## 2018-05-15 NOTE — PROGRESS NOTES
NUTRITION COMPLETE ASSESSMENT    RECOMMENDATIONS:   1. Encourage PO intake with meals and supplements   - document % meals consumed in I/O flowsheet  2. Add daily MVI  3. Weekly weights on standing scale     Interventions/Plan:   Food/Nutrient Delivery:   (snacks) Commercial supplement (Ensure Enlive, [employer])        Nutrition Education: (supplements; resources)    Assessment:   Reason for Assessment:   [x] Provider Consult  [x]At Nutrition Risk    Diet: Cardiac  Supplements: none  Nutritionally Significant Medications: [x] Reviewed & Includes: vit B12, synthroid, NS @ 125ml/hr    Current Hospitalization:   Appetite: Fair  PO Ability: Independent Average po intake:25-50%  Average supplements intake:        Subjective:\"It's hard to cook for one. \"    Objective:  Pt admitted for AMS. PMHx: breast cancer, neuropathy, and hypercholesteremia. TIA noted with plans for stroke workup. Dehydration POA, IVF infusing. Pt visited today. Ate banana, bites of potato, and 100% eggs this morning. Does admit to some wt loss but unsure of how much and period of time. # but unsure when she last weighed that. Notes decline in appetite at home with little motivation to prepare/cook meals for 1 (lives alone). Has done Ensure in the past and agreeable to receiving. Will order Ensure Enlive + Ensure Compact (570kcal, 29g protein). Encourage pt to continue supplements at home and also discussed simple meals ideas along with importance of good protein intake. Patient meets criteria for Moderate Chronic Protein Calorie Malnutrition as evidenced by:   ASPEN Malnutrition Criteria  Acute Illness, Chronic Illness, or Social/Enviornmental: Chronic illness  Energy Intake: <75% est energy req for greater than/equal to 1 month  Body Fat: Mild  Muscle Mass: Mild  ASPEN Malnutrition Score - Chronic Illness: 3  Chronic Illness - Malnutrition Diagnosis: Moderate malnutrition.       Will continue to follow for PO intake, supplement consumption, wt trends. Estimated Nutrition Needs:   Kcals/day: 1125 Kcals/day (1125-1260kcal)  Protein: 50 g (1.1g/kg)  Fluid: 1300 ml (~1ml/kcal)  Based On: Kcal/kg - specify (Comment) (25-28kcal/kg)  Weight Used: Actual wt (45kg)    Pt expected to meet estimated nutrient needs:  []   Yes     []  No [x] Unable to predict at this time  Nutrition Diagnosis:   1. Underweight (Unintended wt loss) related to inadequate protein/energy intake  as evidenced by pt reports of poor PO; 82% UBW; 90% IBW    2. Inadequate oral intake related to difficulty preparing meals/ desire to prepare meals as evidenced by pt reports of limited motivation to prepare meals; wt loss    Goals:     Consumption of at least 50% of meals and 1 supplement/day in 5-7 days; wt maintenance     Monitoring & Evaluation:    - Total energy intake, Enteral/parenteral nutrition intake   - Weight/weight change     Previous Nutrition Goals Met:   N/A  Previous Recommendations:    N/A    Education & Discharge Needs:   [x] None Identified   [] Identified and addressed    [] Participated in care plan, discharge planning, and/or interdisciplinary rounds        Cultural, Denominational and ethnic food preferences identified: None    Skin Integrity: [x]Intact  []Other  Edema: [x]None []Other  Last BM: PTA  Food Allergies: [x]None []Other  Diet Restrictions: Cultural/Yarsani Preference(s): None     Anthropometrics:    Weight Loss Metrics 5/15/2018 5/14/2018   Today's Wt 99 lb 4.8 oz -   BMI - 18.16 kg/m2      Weight Source: Bed  Height: 5' 2\" (157.5 cm),    Body mass index is 18.16 kg/(m^2).   IBW : 49.9 kg (110 lb), % IBW (Calculated): 90.27 %  Usual Body Weight: 54.4 kg (120 lb),      Labs:    Lab Results   Component Value Date/Time    Sodium 144 05/15/2018 02:48 AM    Potassium 3.7 05/15/2018 02:48 AM    Chloride 112 (H) 05/15/2018 02:48 AM    CO2 27 05/15/2018 02:48 AM    Glucose 103 (H) 05/15/2018 02:48 AM    BUN 18 05/15/2018 02:48 AM    Creatinine 0.81 05/15/2018 02:48 AM    Calcium 8.3 (L) 05/15/2018 02:48 AM    Magnesium 1.9 05/15/2018 02:48 AM    Phosphorus 2.8 05/15/2018 02:48 AM    Albumin 3.7 05/14/2018 06:59 PM     Roberto Carlos Langley RD  Pager #[phone] or [phone]

## 2018-05-15 NOTE — CDMP QUERY
Dear Dr. Carolyn Salter,    Please clarify if this patient is (was) being treated/managed for:     => Moderate protein-calorie malnutrition in the setting of possible TIA and dehydration requiring RD consult, lab monitoring, weekly weights and PO supplements  => Other explanation of clinical findings  => Clinically Undetermined (no explanation for clinical findings)    The medical record reflects the following clinical findings, treatment, and risk factors. Risk Factors:  noted dehydration, BMI 18.16, hx breast cancer  Clinical Indicators:  Patient meets criteria for Moderate Chronic Protein Calorie Malnutrition as evidenced by:   ASPEN Malnutrition Criteria  Acute Illness, Chronic Illness, or Social/Environmental: Chronic illness  Energy Intake: <75% est energy req for greater than/equal to 1 month  Body Fat: Mild  Muscle Mass: Mild  ASPEN Malnutrition Score - Chronic Illness: 3  Chronic Illness - Malnutrition Diagnosis: Moderate malnutrition. Treatment: RD consult, lab monitoring, weekly weights, Ensure    Please clarify and document your clinical opinion in the progress notes and discharge summary including the definitive and/or presumptive diagnosis, (suspected or probable), related to the above clinical findings. Please include clinical findings supporting your diagnosis.     Thank You  Kyra Caal,MSN,BSN,RN,[facility]  [phone]

## 2018-05-15 NOTE — PROGRESS NOTES
Speech pathology  Consult received for SLP dysphagia screening. Nursing dysphagia screen completed and WNL. Per RN, patient has been tolerating a regular diet/ thin liquids. Head CT negative. If formal SLP evaluation is desired, please re-consult with SLP eval and treat order as SLP does not complete \"screenings\" only evaluations or treatments. Thanks.     Sarah Mata M.S. CCC-SLP

## 2018-05-15 NOTE — INTERDISCIPLINARY ROUNDS
IDR/SLIDR Summary          Patient: Nicolle Gonzalez MRN: [id number]    Age: 80 y.o. YOB: 1935 Room/Bed: [facility]   Admit Diagnosis: Altered mental status  Dehydration  Numbness and tingling of both lower extremities  Inability to walk  Principal Diagnosis: Altered mental status   Goals: TIA/stroke w/u, MRI, Duplex carotids  Readmission: NO  Quality Measure: Not applicable  VTE Prophylaxis: Mechanical  Influenza Vaccine screening completed? YES  Pneumococcal Vaccine screening completed? NO  Mobility needs: Yes   Nutrition plan:Yes  Consults:P.T, O.T., Speech and Case Management    Financial concerns:No  Escalated to CM? NO  RRAT Score: 5   Interventions: TBD  Testing due for pt today?  YES  LOS: 1 days Expected length of stay 2 days  Discharge plan: TBD   PCP: Not On File [facility]  Transportation needs: No    Days before discharge:one day until discharge   Discharge disposition: TBD    Signed:     Michael Mackay RN  5/15/2018  1:28 AM

## 2018-05-15 NOTE — PROCEDURES
[facility]  *** AMENDED REPORT ***    Name: Cleve Acuña  MRN: [id number]  : 02 Aug 1935  HIS Order #: [id number]  [id number] [facility] Visit #: [id number]  Date: 15 May 2018    TYPE OF TEST: Cerebrovascular Duplex    REASON FOR TEST  Altered mental status. Possible TIA. Right Carotid:-             Proximal               Mid                 Distal  cm/s  Systolic  Diastolic  Systolic  Diastolic  Systolic  Diastolic  CCA:     73.9                                      48.0  Bulb:  ECA:     95.0  ICA:     82.0      26.0       70.0      19.0       86.0      29.0  ICA/CCA:  1.7    ICA Stenosis:    Right Vertebral:-  Finding: Antegrade  Sys:       54.0  Melody:       13.0    Right Subclavian:    Left Carotid:-            Proximal                Mid                 Distal  cm/s  Systolic  Diastolic  Systolic  Diastolic  Systolic  Diastolic  CCA:     91.1                                      57.0  Bulb:  ECA:     62.0  ICA:     64.0      19.0       79.0      24.0       92.0      23.0  ICA/CCA:  1.1    ICA Stenosis:    Left Vertebral:-  Finding: Antegrade  Sys:       48.0  Melody:       12.0    Left Subclavian:    AMENDED INTERPRETATION/FINDINGS  PROCEDURE:  Color duplex ultrasound imaging of extracranial  cerebrovascular arteries. FINDINGS:       Right:  Internal carotid velocity is within normal limits. There   is mild narrowing of the internal carotid flow channel on color  Doppler imaging and mixed density plaque on B-mode imaging, consistent   with less than 50 percent stenosis. The common and external carotid  arteries are patent and without evidence of hemodynamically  significant stenosis. Left:   Internal carotid velocity is within normal limits. There   is mild narrowing of the internal carotid flow channel on color  Doppler imaging and mixed density plaque on B-mode imaging, consistent   with less than 50 percent stenosis.   The common and external carotid  arteries are patent and without evidence of hemodynamically  significant stenosis. IMPRESSION:  Consistent with mild (less than 50 percent) stenosis of  the right and left internal carotid artery. Vertebrals are patent  with antegrade flow. AMENDED COMMENTS    I have personally reviewed the data relevant to the interpretation of  this study.     TECHNOLOGIST: Jim Delgado RVT  Signed: 05/15/2018 11:11 AM    PHYSICIAN: Bessie Wills MD  Signed: 05/15/2018 12:04 PM

## 2018-05-15 NOTE — PROGRESS NOTES
TRANSFER - IN REPORT:    Verbal report received from Kathia(name) on Kaleigh Huynh  being received from ED(unit) for routine progression of care      Report consisted of patients Situation, Background, Assessment and   Recommendations(SBAR). Information from the following report(s) SBAR, MAR and Recent Results was reviewed with the receiving nurse. Opportunity for questions and clarification was provided. Assessment completed upon patients arrival to unit and care assumed.

## 2018-05-16 ENCOUNTER — APPOINTMENT (OUTPATIENT)
Dept: CT IMAGING | Age: 83
DRG: 064 | End: 2018-05-16
Attending: PSYCHIATRY & NEUROLOGY
Payer: MEDICARE

## 2018-05-16 PROBLEM — I63.9 STROKE (CEREBRUM) (HCC): Status: ACTIVE | Noted: 2018-05-16

## 2018-05-16 LAB
1,25(OH)2D3 SERPL-MCNC: 31.3 PG/ML (ref 19.9–79.3)
CHOLEST SERPL-MCNC: 105 MG/DL
EST. AVERAGE GLUCOSE BLD GHB EST-MCNC: 100 MG/DL
HBA1C MFR BLD: 5.1 % (ref 4.2–6.3)
HDLC SERPL-MCNC: 58 MG/DL
HDLC SERPL: 1.8 {RATIO} (ref 0–5)
LDLC SERPL CALC-MCNC: 21.6 MG/DL (ref 0–100)
LIPID PROFILE,FLP: NORMAL
TRIGL SERPL-MCNC: 127 MG/DL (ref ?–150)
VLDLC SERPL CALC-MCNC: 25.4 MG/DL

## 2018-05-16 PROCEDURE — 74011636320 HC RX REV CODE- 636/320: Performed by: INTERNAL MEDICINE

## 2018-05-16 PROCEDURE — 74011000258 HC RX REV CODE- 258: Performed by: INTERNAL MEDICINE

## 2018-05-16 PROCEDURE — 93306 TTE W/DOPPLER COMPLETE: CPT

## 2018-05-16 PROCEDURE — 97116 GAIT TRAINING THERAPY: CPT

## 2018-05-16 PROCEDURE — 74011250636 HC RX REV CODE- 250/636: Performed by: INTERNAL MEDICINE

## 2018-05-16 PROCEDURE — 70498 CT ANGIOGRAPHY NECK: CPT

## 2018-05-16 PROCEDURE — 80061 LIPID PANEL: CPT | Performed by: INTERNAL MEDICINE

## 2018-05-16 PROCEDURE — 82525 ASSAY OF COPPER: CPT | Performed by: INTERNAL MEDICINE

## 2018-05-16 PROCEDURE — 36415 COLL VENOUS BLD VENIPUNCTURE: CPT | Performed by: INTERNAL MEDICINE

## 2018-05-16 PROCEDURE — 97530 THERAPEUTIC ACTIVITIES: CPT

## 2018-05-16 PROCEDURE — 83036 HEMOGLOBIN GLYCOSYLATED A1C: CPT | Performed by: INTERNAL MEDICINE

## 2018-05-16 PROCEDURE — 94761 N-INVAS EAR/PLS OXIMETRY MLT: CPT

## 2018-05-16 PROCEDURE — 74011250637 HC RX REV CODE- 250/637: Performed by: INTERNAL MEDICINE

## 2018-05-16 PROCEDURE — 65660000000 HC RM CCU STEPDOWN

## 2018-05-16 PROCEDURE — 83921 ORGANIC ACID SINGLE QUANT: CPT | Performed by: INTERNAL MEDICINE

## 2018-05-16 PROCEDURE — 74011250637 HC RX REV CODE- 250/637: Performed by: FAMILY MEDICINE

## 2018-05-16 PROCEDURE — 70496 CT ANGIOGRAPHY HEAD: CPT

## 2018-05-16 RX ORDER — CLOPIDOGREL BISULFATE 75 MG/1
75 TABLET ORAL DAILY
Status: DISCONTINUED | OUTPATIENT
Start: 2018-05-17 | End: 2018-05-19 | Stop reason: HOSPADM

## 2018-05-16 RX ORDER — AMOXICILLIN 250 MG
1 CAPSULE ORAL DAILY
Status: DISCONTINUED | OUTPATIENT
Start: 2018-05-16 | End: 2018-05-19 | Stop reason: HOSPADM

## 2018-05-16 RX ORDER — POLYETHYLENE GLYCOL 3350 17 G/17G
17 POWDER, FOR SOLUTION ORAL DAILY
Status: DISCONTINUED | OUTPATIENT
Start: 2018-05-16 | End: 2018-05-19 | Stop reason: HOSPADM

## 2018-05-16 RX ORDER — GUAIFENESIN 100 MG/5ML
81 LIQUID (ML) ORAL DAILY
Status: DISCONTINUED | OUTPATIENT
Start: 2018-05-16 | End: 2018-05-16

## 2018-05-16 RX ORDER — SODIUM CHLORIDE 0.9 % (FLUSH) 0.9 %
10 SYRINGE (ML) INJECTION
Status: COMPLETED | OUTPATIENT
Start: 2018-05-16 | End: 2018-05-16

## 2018-05-16 RX ADMIN — SODIUM CHLORIDE 75 ML/HR: 900 INJECTION, SOLUTION INTRAVENOUS at 11:29

## 2018-05-16 RX ADMIN — STANDARDIZED SENNA CONCENTRATE AND DOCUSATE SODIUM 1 TABLET: 8.6; 5 TABLET, FILM COATED ORAL at 10:04

## 2018-05-16 RX ADMIN — MIRTAZAPINE 15 MG: 15 TABLET, FILM COATED ORAL at 21:33

## 2018-05-16 RX ADMIN — IOPAMIDOL 100 ML: 755 INJECTION, SOLUTION INTRAVENOUS at 21:32

## 2018-05-16 RX ADMIN — GABAPENTIN 300 MG: 100 CAPSULE ORAL at 08:21

## 2018-05-16 RX ADMIN — Medication 10 ML: at 21:33

## 2018-05-16 RX ADMIN — POLYETHYLENE GLYCOL 3350 17 G: 17 POWDER, FOR SOLUTION ORAL at 10:04

## 2018-05-16 RX ADMIN — SODIUM CHLORIDE 100 ML: 900 INJECTION, SOLUTION INTRAVENOUS at 21:32

## 2018-05-16 RX ADMIN — SIMVASTATIN 20 MG: 40 TABLET, FILM COATED ORAL at 21:33

## 2018-05-16 RX ADMIN — TRAZODONE HYDROCHLORIDE 100 MG: 50 TABLET ORAL at 21:33

## 2018-05-16 RX ADMIN — Medication 10 ML: at 13:35

## 2018-05-16 RX ADMIN — LEVOTHYROXINE SODIUM 88 MCG: 88 TABLET ORAL at 06:32

## 2018-05-16 RX ADMIN — Medication 1000 MCG: at 08:21

## 2018-05-16 RX ADMIN — ASPIRIN 81 MG 81 MG: 81 TABLET ORAL at 10:04

## 2018-05-16 RX ADMIN — Medication 10 ML: at 21:32

## 2018-05-16 RX ADMIN — TEMAZEPAM 30 MG: 15 CAPSULE ORAL at 21:32

## 2018-05-16 NOTE — PROGRESS NOTES
Problem: Falls - Risk of  Goal: *Absence of Falls  Document Marcela Fall Risk and appropriate interventions in the flowsheet.    Outcome: Progressing Towards Goal  Fall Risk Interventions:  Mobility Interventions: Bed/chair exit alarm, Patient to call before getting OOB    Mentation Interventions: Adequate sleep, hydration, pain control, Bed/chair exit alarm    Medication Interventions: Patient to call before getting OOB, Evaluate medications/consider consulting pharmacy, Bed/chair exit alarm    Elimination Interventions: Bed/chair exit alarm, Patient to call for help with toileting needs

## 2018-05-16 NOTE — PROGRESS NOTES
The CM met with the patient and son Shen Cohen at bedside in order to discuss disposition- patient lives home alone without immediate family nearby, PT/OT recommending rehab vs. 24/7 assistance which is unable to be provided by family. The CM discussed rehab with patient, frustrated but open to reviewing options- family has numerous concerns about the patient returning to live home alone. The CM provided a list of SNF facilities in [place], in addition to competing a search within [employer] for rehab facilities within a 30-40 mile radiance of the patient's home in [place]. The family is to discuss together if they would like to pursue rehab options in [place] vs. [place] (patient's son lives here locally). The CM discussed that family would most likely have to provide transportation- family verbalized understanding (Medicare may not pay for transport over state lines). Shen Cohen asked to speak with CM outside and verbalized concerns regarding the patient's ability to return home. CM sent a referral to [facility] to screen for Medicaid, however, CM explained to family that [facility] may not be able to assist because patient is a resident of [place], not [place], and Medicaid regulations vary from state to state. The family asked if CM could speak with  they know, CM explained that written permission would need to be obtained from patient to discuss disposition with members not identified or involved directly with the patient's continuity of care ( is not involved in patient's care, just a friend of the family). Family verbalized understanding. The CM also called and spoke with Christine Mckeon, the patient's other son in [place], in order to discuss disposition- aware that patient may require rehab. CM emailed a list of [place] facilities for Christine Mckeon in order for family to discuss together where they would like the patient to receive rehab. CM will continue to follow.  MARTIN Zepeda

## 2018-05-16 NOTE — PROGRESS NOTES
Hospitalist Progress Note  Miguel Valenzuela MD  Answering service: [phone] OR [phone] from in house phone        Date of Service:  2018  NAME:  Makenna Victor  :  1935  MRN:  [id number]      Admission Summary:   An 70-year-old white female with past medical history of peripheral neuropathy, breast cancer status post bilateral mastectomy, hyperlipidemia, presented to the emergency department from home with altered mental status and chief complaint of bilateral leg and feet numbness. She has not complained of any new onset blurred vision, diplopia, slurred speech, neck pain, back pain, chest pain, shortness of breath, cough, congestion, abdominal pain, nausea, vomiting, diarrhea, melena, dysuria, hematuria or bowel or bladder incontinence calf pain, swelling, fever, chills, rash, head and neck trauma or falls. Her son and daughter-in-law later note that the patient has poor recollection of the events from earlier today. He notes that her symptoms were severe such that she was slumped over after eating a sandwich. There are no reports of any choking. There are no reports of any respiratory difficulty. He notes that rather she had loss of memory and amnesia of events that occurred this afternoon. He notes this is a definite change compared to her baseline. Evaluation in the emergency department for vital signs of blood pressure 125/72, heart rate 107, respirations 16, saturation 97% on room air. Labs showed a BUN of 23, creatinine 1.07. CT:  Head without contrast, atrophy, significant small vessel ischemic changes with a focal area of chronic ischemia in the left cerebellum with no acute abnormalities. Code stroke had been called. Interval history / Subjective:    Patient seen and examined; MRI brain result discussed with the patient and her son Ramon Tellez over the phone.   She states her b/l LE numbness is better today, still feels weak. No acute event overnight  Assessment & Plan:     #. Altered mental status; resolved now,  -Metabolic encephalopathy, related to dehydration vs Acute stroke.  -Fall precaution, neuro check    #. Acute stroke: MRI brain showed left parietal convexity acute ischemic changes  without significant associated hemorrhage or mass effect.  -Carotid Doppler without hemodynamically significant stenosis  -TTE reported Normal LV systolic function, EF 47-73%, no obvious wall motion abnormality, grade 1 diastolic dysfunction. -neurology evaluation appreciated; suggested to change ASA to Plavix, continue Statin and    #. Bilateral LE weakness associated with numbness;  -Probably related to severe peripheral neuropathy, on physical exam noted decreased LE sensation but the motor strength is good. -According to the pt, this has been going on for more than 2 months.  -Gabapentin not helping much. -Vit B12 WNL, check Vit D, copper and MMA  -Arrange for Neuro F/U 4 weeks- may consider EMG PN protocol to further evaluate LE paresthesias. Neuropathy labs pending-ROLANDO added   -Continue PT/OT    #. Dehydration: continue gentle IV hydration    #. HTN/HLD; resume home meds    #. Moderate Chronic Protein Calorie Malnutrition; nutritionist consult   #.  Insomnia: continue prn home meds for sleep  Code status: Full  DVT prophylaxis: SCD    Care Plan discussed with: Patient/Family and Nurse  Disposition: pending PT/OT final recommendation, possible rehab     Hospital Problems  Date Reviewed: 5/14/2018          Codes Class Noted POA    Dehydration ICD-10-CM: E86.0  ICD-9-CM: 276.51  5/14/2018 Unknown        * (Principal)Altered mental status ICD-10-CM: R41.82  ICD-9-CM: 780.97  5/14/2018 Unknown        Inability to walk ICD-10-CM: R26.2  ICD-9-CM: 719.7  5/14/2018 Unknown        Numbness and tingling of both lower extremities ICD-10-CM: R20.0, R20.2  ICD-9-CM: 782.0  5/14/2018 Unknown                Review of Systems:   A comprehensive review of systems was negative except for that written in the HPI. Vital Signs:    Last 24hrs VS reviewed since prior progress note. Most recent are:  Visit Vitals    /51 (BP 1 Location: Right arm, BP Patient Position: At rest)    Pulse 65    Temp 98.1 °F (36.7 °C)    Resp 15    Ht 5' 2\" (1.575 m)    Wt 43.9 kg (96 lb 12.8 oz)    SpO2 94%    BMI 17.7 kg/m2       No intake or output data in the 24 hours ending 05/16/18 1320     Physical Examination:             Constitutional:  No acute distress, cooperative, pleasant    ENT:  Oral mucous moist, oropharynx benign. Neck supple,    Resp:  CTA bilaterally. No wheezing/rhonchi/rales. No accessory muscle use   CV:  Regular rhythm, normal rate, no murmurs, gallops, rubs    GI:  Soft, non distended, non tender. normoactive bowel sounds, no hepatosplenomegaly     Musculoskeletal:  No edema, warm, 2+ pulses throughout    Neurologic:  Moves all extremities; decreased b/l LE sensation. AAOx3, CN II-XII reviewed     Psych:  Good insight, Not anxious nor agitated. Data Review:    Review and/or order of clinical lab test  Review and/or order of tests in the radiology section of CPT      Labs:     Recent Labs      05/15/18   0248  05/14/18   1859   WBC  8.7  9.6   HGB  10.7*  11.7   HCT  32.3*  35.5   PLT  193  216     Recent Labs      05/15/18   0248  05/14/18   1859   NA  144  141   K  3.7  3.9   CL  112*  107   CO2  27  28   BUN  18  23*   CREA  0.81  1.07*   GLU  103*  86   CA  8.3*  8.7   MG  1.9   --    PHOS  2.8   --      Recent Labs      05/14/18 1859   SGOT  18   ALT  16   AP  56   TBILI  0.3   TP  6.7   ALB  3.7   GLOB  3.0     Recent Labs      05/14/18 1859 05/14/18   1845   INR  1.0  0.9   PTP  9.9   --    APTT  25.9   --       No results for input(s): FE, TIBC, PSAT, FERR in the last 72 hours. No results found for: FOL, RBCF   No results for input(s): PH, PCO2, PO2 in the last 72 hours.   Recent Labs      05/14/18   5225 TROIQ  <0.04     Lab Results   Component Value Date/Time    Cholesterol, total 105 05/16/2018 11:31 AM    HDL Cholesterol 58 05/16/2018 11:31 AM    LDL, calculated 21.6 05/16/2018 11:31 AM    Triglyceride 127 05/16/2018 11:31 AM    CHOL/HDL Ratio 1.8 05/16/2018 11:31 AM     Lab Results   Component Value Date/Time    Glucose (POC) 94 05/14/2018 06:43 PM     Lab Results   Component Value Date/Time    Color YELLOW/STRAW 05/14/2018 07:07 PM    Appearance CLEAR 05/14/2018 07:07 PM    Specific gravity <1.005 05/14/2018 07:07 PM    pH (UA) 5.5 05/14/2018 07:07 PM    Protein NEGATIVE  05/14/2018 07:07 PM    Glucose NEGATIVE  05/14/2018 07:07 PM    Ketone NEGATIVE  05/14/2018 07:07 PM    Bilirubin NEGATIVE  05/14/2018 07:07 PM    Urobilinogen 0.2 05/14/2018 07:07 PM    Nitrites NEGATIVE  05/14/2018 07:07 PM    Leukocyte Esterase NEGATIVE  05/14/2018 07:07 PM    Epithelial cells FEW 05/14/2018 07:07 PM    Bacteria NEGATIVE  05/14/2018 07:07 PM    WBC 0-4 05/14/2018 07:07 PM    RBC 0-5 05/14/2018 07:07 PM         Medications Reviewed:     Current Facility-Administered Medications   Medication Dose Route Frequency    aspirin chewable tablet 81 mg  81 mg Oral DAILY    polyethylene glycol (MIRALAX) packet 17 g  17 g Oral DAILY    senna-docusate (PERICOLACE) 8.6-50 mg per tablet 1 Tab  1 Tab Oral DAILY    acetaminophen (TYLENOL) tablet 650 mg  650 mg Oral Q6H PRN    atenolol (TENORMIN) tablet 25 mg  25 mg Oral DAILY    levothyroxine (SYNTHROID) tablet 88 mcg  88 mcg Oral ACB    amLODIPine (NORVASC) tablet 5 mg  5 mg Oral DAILY    cyanocobalamin (VITAMIN B12) tablet 1,000 mcg  1,000 mcg Oral DAILY    gabapentin (NEURONTIN) capsule 300 mg  300 mg Oral DAILY    mirtazapine (REMERON) tablet 15 mg  15 mg Oral QHS    simvastatin (ZOCOR) tablet 20 mg  20 mg Oral QHS    temazepam (RESTORIL) capsule 30 mg  30 mg Oral QHS PRN    traZODone (DESYREL) tablet 100 mg  100 mg Oral QHS    sodium chloride (NS) flush 5-10 mL  5-10 mL IntraVENous Q8H    sodium chloride (NS) flush 5-10 mL  5-10 mL IntraVENous PRN    0.9% sodium chloride infusion  75 mL/hr IntraVENous CONTINUOUS    nicotine (NICODERM CQ) 21 mg/24 hr patch 1 Patch  1 Patch TransDERmal DAILY     ______________________________________________________________________  EXPECTED LENGTH OF STAY: 2d 2h  ACTUAL LENGTH OF STAY:          2                 Joel Frias MD

## 2018-05-16 NOTE — PROGRESS NOTES
Problem: Mobility Impaired (Adult and Pediatric)  Goal: *Acute Goals and Plan of Care (Insert Text)  Physical Therapy Goals  Initiated 5/15/2018  1. Patient will move from supine to sit and sit to supine  and scoot up and down in bed with modified independence within 7 day(s). 2.  Patient will transfer from bed to chair and chair to bed with modified independence using the least restrictive device within 7 day(s). 3.  Patient will perform sit to stand with modified independence within 7 day(s). 4.  Patient will ambulate with modified independence for 300 feet with the least restrictive device within 7 day(s). 5.  Patient will improve Fowler Balance score by 7 points within 7 days. physical Therapy TREATMENT  Patient: Radha Joyner (29 y.o. female)  Date: 5/16/2018  Diagnosis: Altered mental status  Dehydration  Numbness and tingling of both lower extremities  Inability to walk Stroke (cerebrum) [facility])       Precautions: Fall  Chart, physical therapy assessment, plan of care and goals were reviewed. ASSESSMENT:  Cleared for mobility by RN, received supine in bed with lights off - appears with somewhat depressed affect this date, expressing concern over uncertainty of discharge plans and being able to return home alone. Note MRI showing acute ischemic changes L parietal convexity and remote L cerebellar infarct. Pt remains highly impulsive with decreased insight to deficits or safety, intermittently confused, and STM deficits apparent throughout session. Gait training progressed in hallway with up to min A - emphasis on incorporating DGI components, side stepping, retro amb, and dual task integration. Several mild to moderate LOBs noted requiring physical assist to correct from - however again, pt seemingly unaware of LOBs and falls risk. Recommend discharge to SNF level rehab once medically cleared  - continue to question pt's ability to return to living independently from a cognitive standpoint.  Will follow. Progression toward goals:  []       Improving appropriately and progressing toward goals  [x]       Improving slowly and progressing toward goals  []       Not making progress toward goals and plan of care will be adjusted     PLAN:  Patient continues to benefit from skilled intervention to address the above impairments. Continue treatment per established plan of care. Discharge Recommendations:  [facility]  Further Equipment Recommendations for Discharge:  TBD     SUBJECTIVE:   Patient stated Do you think I can drive?  (referred to physician however explained likely unsafe at this point)    OBJECTIVE DATA SUMMARY:   Critical Behavior:  Neurologic State: Alert  Orientation Level: Oriented X4  Cognition: Follows commands  Safety/Judgement: Awareness of environment, Decreased awareness of need for assistance, Decreased awareness of need for safety, Decreased insight into deficits, Fall prevention  Functional Mobility Training:  Bed Mobility:     Supine to Sit: Stand-by assistance     Transfers:  Sit to Stand: Contact guard assistance  Stand to Sit: Contact guard assistance     Balance:  Sitting: Intact  Standing: Impaired  Standing - Static: Fair  Standing - Dynamic : Fair  Ambulation/Gait Training:  Distance (ft): 200 Feet (ft)  Assistive Device: Gait belt  Ambulation - Level of Assistance: Minimal assistance;Contact guard assistance  Gait Abnormalities: Ataxic;Decreased step clearance; Path deviations;Trunk sway increased  Base of Support: Narrowed; Shift to right  Speed/Virgie: Shuffled;Fluctuations       Pain:  Pain Scale 1: Numeric (0 - 10)  Pain Intensity 1: 0        Activity Tolerance:   NAd    Please refer to the flowsheet for vital signs taken during this treatment.   After treatment:   [x] Patient left in no apparent distress sitting up in chair  [] Patient left in no apparent distress in bed  [x] Call bell left within reach  [x] Nursing notified  [] Caregiver present  [x] chair alarm activated    COMMUNICATION/EDUCATION:   The patients plan of care was discussed with: Registered Nurse. Patient was educated regarding Her deficit(s) of imapired balance as this relates to Her diagnosis of remote CVA. She demonstrated Good understanding as evidenced by nodding. Patient and/or family was verbally educated on the BE FAST acronym for signs/symptoms of CVA and TIA. BE FAST was written on patient's communication board  for visual education and reinforcement. All questions answered with patient indicating good understanding. [x]  Fall prevention education was provided and the patient/caregiver indicated understanding. [x]  Patient/family have participated as able in goal setting and plan of care. [x]  Patient/family agree to work toward stated goals and plan of care. []  Patient understands intent and goals of therapy, but is neutral about his/her participation. []  Patient is unable to participate in goal setting and plan of care.     Thank you for this referral.  Parveen Chen, PT, DPT   Time Calculation: 24 mins

## 2018-05-16 NOTE — PROGRESS NOTES
1930 - Bedside and Verbal shift change report given to mauricio gusman (oncoming nurse) by Rudi ro (offgoing nurse). Report included the following information SBAR, Kardex, Intake/Output, MAR, Recent Results and Cardiac Rhythm SR.   0730 - Bedside and Verbal shift change report given to obed (oncoming nurse) by Cari Reece (offgoing nurse). Report included the following information SBAR, Kardex, Intake/Output, MAR, Recent Results and Cardiac Rhythm NSR.

## 2018-05-16 NOTE — CONSULTS
NEUROLOGY CONSULT NOTE    Name Moy Hong Age 80 y.o. MRN [id number]  1935     Consulting Physician: Moshe Grigsby MD      Chief Complaint:  B/L LE numbness, weakness     Assessment:     Principal Problem:    Stroke (cerebrum) ([address] (2018)    Active Problems:    Dehydration (2018)      Altered mental status (2018)      Inability to walk (2018)      Numbness and tingling of both lower extremities (2018)      80year old RHF h/o breast CA s/p mastectomy, HTN, HPL, hypothyroidism, tobacco use presenting with AMS and difficulty ambulating due to b/l LE numbness/weakness on admission 18. AMS appears resolved at present, however she still endorses some numbness of the distal LE possibly related to peripheral neuropathy. She underwent head CT on admission showing no acute process, remote L cerebellar stroke and cortical atrophy noted. Subsequent MRI Brain completed showing small territory L parasagittal parietal scattered stroke. CUS<50% b/l carotid stenosis, patent VAs. Recommendations:   CTA H/N  Advise transition to Plavix monotherapy for stroke prevention along with statin therapy  Goal LDL<70, SBP<140  Stroke education  Extended cardiac monitoring on discharge  Dispo planning  Please arrange for Neuro F/U 4 weeks- may consider EMG PN protocol to further evaluate LE paresthesias. Neuropathy labs pending-ROLANDO added. Please call if further questions/concerns    Thank you very much for this referral. I appreciate the opportunity to participate in this patient's care. History of Present Illness: This is a 80 y.o.  right handed  female, we were asked to see for b/l LE numbness, weakness. PMH notable for breast CA s/p mastectomy, HTN, HPL, hypothyroidism, tobacco use. Per collective reports, she was noted with some AMS 18 along with difficulty walking with worsening of her baseline b/l LE numbness as well as weakness.   She has a reported h/o peripheral neuropathy per the medical record but patient denies this. She underwent head CT on admission showing no acute process, remote L cerebellar stroke and cortical atrophy noted. Subsequent MRI Brain completed showing small territory L parasagittal parietal scattered stroke. CUS<50% b/l carotid stenosis, patent VAs. Pt states she was taking ASA 325mg daily prior to admission. LE numbness is largely unchanged today. No Known Allergies     Prior to Admission medications    Medication Sig Start Date End Date Taking? Authorizing Provider   amLODIPine (NORVASC) 5 mg tablet Take 5 mg by mouth daily. Indications: hypertension   Yes Historical Provider   temazepam (RESTORIL) 15 mg capsule Take 30 mg by mouth nightly as needed for Sleep. Yes Historical Provider   traZODone (DESYREL) 50 mg tablet Take 100 mg by mouth nightly. Yes Historical Provider   mirtazapine (REMERON) 15 mg tablet Take 15 mg by mouth nightly. Yes Historical Provider   simvastatin (ZOCOR) 20 mg tablet Take 20 mg by mouth nightly. Yes Historical Provider   atenolol (TENORMIN) 25 mg tablet Take 25 mg by mouth daily. Yes Historical Provider   cyanocobalamin (VITAMIN B-12) 1,000 mcg tablet Take 1,000 mcg by mouth daily. Yes Historical Provider   gabapentin (NEURONTIN) 100 mg capsule Take 100 mg by mouth three (3) times daily. Yes Historical Provider       Past Medical History:   Diagnosis Date    Breast cancer ([facility]     Hypercholesteremia     Neuropathy         No past surgical history on file. Social History   Substance Use Topics    Smoking status: Not on file    Smokeless tobacco: Not on file    Alcohol use Not on file        No family history on file. Review of Systems:   Comprehensive review of systems performed and negative except for as listed above.      Exam:     Visit Vitals    /51 (BP 1 Location: Right arm, BP Patient Position: At rest)    Pulse 65    Temp 98.1 °F (36.7 °C)    Resp 15    Ht 5' 2\" (1.575 m)    Wt 43.9 kg (96 lb 12.8 oz)    SpO2 94%    BMI 17.7 kg/m2        General: Well developed, well nourished. Patient in no apparent distress. Slightly irritated. Head: Normocephalic, atraumatic, anicteric sclera   Lungs:  Clear to auscultation bilaterally, No wheezes or rubs   Cardiac: Regular rate and rhythm with no murmurs. Abd: Bowel sounds were audible. No tenderness on palpation   Ext: No pedal edema   Skin: No overt signs of rash     Neurological Exam:  Mental Status: Alert and oriented   Speech: Fluent no aphasia or dysarthria. Cranial Nerves:   Intact visual fields. Facial sensation is normal. Facial movement is symmetric. Palate is midline. Normal sternocleidomastoid strength. Tongue is midline. Hearing is intact bilaterally. Eyes: PERRL, EOM's full, no nystagmus, no ptosis. Motor:  Full and symmetric strength of upper and lower proximal and distal muscles. Normal bulk and tone. Reflexes:   Deep tendon reflexes 2+/4 UE b/l and trace b/l LE. Plantar response is downgoing b/l. Sensory:   Symmetrically intact  with no perceived deficits modalities involving small or large fibers. Gait:  Gait is deferred    Tremor:   Mild intention tremor b/l UE   Cerebellar:  Finger to nose and heel over shin to knee was demonstrated competently. Neurovascular: No carotid bruits. Imaging  CT Results (maximum last 3): Results from Hospital Encounter encounter on 05/14/18   CT CODE NEURO HEAD WO CONTRAST   Narrative EXAM:  CT CODE NEURO HEAD WO CONTRAST    INDICATION:   Bilateral lower extremity numbness    COMPARISON: None. CONTRAST:  None. TECHNIQUE: Unenhanced CT of the head was performed using 5 mm images. Brain and  bone windows were generated. CT dose reduction was achieved through use of a  standardized protocol tailored for this examination and automatic exposure  control for dose modulation. FINDINGS:  Generalized atrophy is noted.  Significant small vessel ischemic changes are seen  in the periventricular white matter. Focal area of chronic ischemia is noted in  the left cerebellum. There is no intracranial hemorrhage, extra-axial  collection, mass, mass effect or midline shift. The basilar cisterns are open. No acute infarct is identified. The bone windows demonstrate no abnormalities. Complete opacification of the left maxillary sinus is noted. Vascular  calcification is noted. Impression IMPRESSION: Atrophy. Significant small vessel ischemic changes. No acute  abnormality. Focal area of chronic ischemia left cerebellum. MRI Results (maximum last 3): Results from Hospital Encounter encounter on 05/14/18   MRI BRAIN WO CONT   Narrative Preliminary report: Several punctate subcentimeter foci of stricture diffusion  within the left parietal lobe consistent with acute infarcts. 3 mm focus of  restricted diffusion within the medial left frontal lobe, likely representing an  infarct. Final report to follow. Findings were communicated to the Nurse Kacy   at the time of interpretation. Clinical indication: Lateral lower extremity numbness and weakness. Change in  mental status. Technical factors: Sagittal T1-weighted, diffusion imaging, axial T1-weighted  T2-weighted FLAIR gradient echo sagittal FLAIR coronal T2-weighted. No prior. Diffusion imaging show Small acute areas of infarction are seen in the left  parasagittal parietal lobe. No associated hemorrhage or significant mass effect. There is a small remote left cerebellar infarct. Incidental paranasal sinus  disease mainly left-sided. There is mild atrophy and some nonspecific  periventricular white matter changes. There is no extra-axial fluid collection,  hemorrhage or shift of the midline. Major flow voids in vessels at the base of  the brain are present. There is no masses.          Impression IMPRESSION: Acute ischemic changes without significant associated hemorrhage or  mass effect left parietal convexity. Atrophy and nonspecific white matter  disease. Remote left cerebellar infarct. Lab Review  Lab Results   Component Value Date/Time    WBC 8.7 05/15/2018 02:48 AM    HCT 32.3 (L) 05/15/2018 02:48 AM    HGB 10.7 (L) 05/15/2018 02:48 AM    PLATELET 789 20/30/1473 02:48 AM     Lab Results   Component Value Date/Time    Sodium 144 05/15/2018 02:48 AM    Potassium 3.7 05/15/2018 02:48 AM    Chloride 112 (H) 05/15/2018 02:48 AM    CO2 27 05/15/2018 02:48 AM    Glucose 103 (H) 05/15/2018 02:48 AM    BUN 18 05/15/2018 02:48 AM    Creatinine 0.81 05/15/2018 02:48 AM    Calcium 8.3 (L) 05/15/2018 02:48 AM     No components found for: TROPQUANT  No results found for: STORM    Signed:  Kindra Cohen DO  5/16/2018  2:27 PM

## 2018-05-16 NOTE — PROGRESS NOTES
Problem: TIA/CVA Stroke: Day 2 Until Discharge  Goal: Diagnostic Test/Procedures  Outcome: Progressing Towards Goal  Mri results read to patient and family (with patients consent). Patient understands results.

## 2018-05-16 NOTE — PROGRESS NOTES
Bedside and Verbal shift change report given to Kati Jaramillo RN (oncoming nurse) by Guillaume Coffey RN (offgoing nurse). Report included the following information SBAR, Kardex, Procedure Summary, Intake/Output, MAR, Recent Results and Cardiac Rhythm NSR.

## 2018-05-17 LAB
ALBUMIN SERPL-MCNC: 2.8 G/DL (ref 3.5–5)
ALBUMIN/GLOB SERPL: 1 {RATIO} (ref 1.1–2.2)
ALP SERPL-CCNC: 46 U/L (ref 45–117)
ALT SERPL-CCNC: 15 U/L (ref 12–78)
ANION GAP SERPL CALC-SCNC: 5 MMOL/L (ref 5–15)
AST SERPL-CCNC: 16 U/L (ref 15–37)
BILIRUB SERPL-MCNC: 0.3 MG/DL (ref 0.2–1)
BUN SERPL-MCNC: 16 MG/DL (ref 6–20)
BUN/CREAT SERPL: 20 (ref 12–20)
CALCIUM SERPL-MCNC: 8.3 MG/DL (ref 8.5–10.1)
CHLORIDE SERPL-SCNC: 106 MMOL/L (ref 97–108)
CO2 SERPL-SCNC: 30 MMOL/L (ref 21–32)
CREAT SERPL-MCNC: 0.81 MG/DL (ref 0.55–1.02)
ERYTHROCYTE [DISTWIDTH] IN BLOOD BY AUTOMATED COUNT: 12.5 % (ref 11.5–14.5)
GLOBULIN SER CALC-MCNC: 2.9 G/DL (ref 2–4)
GLUCOSE SERPL-MCNC: 87 MG/DL (ref 65–100)
HCT VFR BLD AUTO: 33 % (ref 35–47)
HGB BLD-MCNC: 10.7 G/DL (ref 11.5–16)
MCH RBC QN AUTO: 32 PG (ref 26–34)
MCHC RBC AUTO-ENTMCNC: 32.4 G/DL (ref 30–36.5)
MCV RBC AUTO: 98.8 FL (ref 80–99)
NRBC # BLD: 0 K/UL (ref 0–0.01)
NRBC BLD-RTO: 0 PER 100 WBC
PLATELET # BLD AUTO: 190 K/UL (ref 150–400)
PMV BLD AUTO: 10.4 FL (ref 8.9–12.9)
POTASSIUM SERPL-SCNC: 4.3 MMOL/L (ref 3.5–5.1)
PROT SERPL-MCNC: 5.7 G/DL (ref 6.4–8.2)
RBC # BLD AUTO: 3.34 M/UL (ref 3.8–5.2)
SODIUM SERPL-SCNC: 141 MMOL/L (ref 136–145)
WBC # BLD AUTO: 8.3 K/UL (ref 3.6–11)

## 2018-05-17 PROCEDURE — 74011250637 HC RX REV CODE- 250/637: Performed by: PSYCHIATRY & NEUROLOGY

## 2018-05-17 PROCEDURE — 74011250637 HC RX REV CODE- 250/637: Performed by: INTERNAL MEDICINE

## 2018-05-17 PROCEDURE — 97530 THERAPEUTIC ACTIVITIES: CPT

## 2018-05-17 PROCEDURE — 65660000000 HC RM CCU STEPDOWN

## 2018-05-17 PROCEDURE — 86334 IMMUNOFIX E-PHORESIS SERUM: CPT | Performed by: PSYCHIATRY & NEUROLOGY

## 2018-05-17 PROCEDURE — 80053 COMPREHEN METABOLIC PANEL: CPT | Performed by: FAMILY MEDICINE

## 2018-05-17 PROCEDURE — 74011250637 HC RX REV CODE- 250/637: Performed by: FAMILY MEDICINE

## 2018-05-17 PROCEDURE — 93270 REMOTE 30 DAY ECG REV/REPORT: CPT | Performed by: FAMILY MEDICINE

## 2018-05-17 PROCEDURE — 85027 COMPLETE CBC AUTOMATED: CPT | Performed by: FAMILY MEDICINE

## 2018-05-17 PROCEDURE — 36415 COLL VENOUS BLD VENIPUNCTURE: CPT | Performed by: FAMILY MEDICINE

## 2018-05-17 PROCEDURE — 97116 GAIT TRAINING THERAPY: CPT

## 2018-05-17 RX ORDER — FACIAL-BODY WIPES
10 EACH TOPICAL DAILY PRN
Status: DISCONTINUED | OUTPATIENT
Start: 2018-05-17 | End: 2018-05-19 | Stop reason: HOSPADM

## 2018-05-17 RX ORDER — AMLODIPINE BESYLATE 5 MG/1
2.5 TABLET ORAL DAILY
Status: DISCONTINUED | OUTPATIENT
Start: 2018-05-18 | End: 2018-05-19 | Stop reason: HOSPADM

## 2018-05-17 RX ORDER — ATENOLOL 25 MG/1
12.5 TABLET ORAL DAILY
Status: DISCONTINUED | OUTPATIENT
Start: 2018-05-18 | End: 2018-05-19 | Stop reason: HOSPADM

## 2018-05-17 RX ADMIN — BISACODYL 10 MG: 10 SUPPOSITORY RECTAL at 22:35

## 2018-05-17 RX ADMIN — CLOPIDOGREL BISULFATE 75 MG: 75 TABLET ORAL at 09:06

## 2018-05-17 RX ADMIN — TRAZODONE HYDROCHLORIDE 100 MG: 50 TABLET ORAL at 22:12

## 2018-05-17 RX ADMIN — Medication 10 ML: at 06:48

## 2018-05-17 RX ADMIN — Medication 1000 MCG: at 09:06

## 2018-05-17 RX ADMIN — TEMAZEPAM 30 MG: 15 CAPSULE ORAL at 22:13

## 2018-05-17 RX ADMIN — SIMVASTATIN 20 MG: 40 TABLET, FILM COATED ORAL at 22:12

## 2018-05-17 RX ADMIN — LEVOTHYROXINE SODIUM 88 MCG: 88 TABLET ORAL at 06:48

## 2018-05-17 RX ADMIN — AMLODIPINE BESYLATE 5 MG: 5 TABLET ORAL at 09:08

## 2018-05-17 RX ADMIN — ATENOLOL 25 MG: 25 TABLET ORAL at 09:08

## 2018-05-17 RX ADMIN — GABAPENTIN 300 MG: 100 CAPSULE ORAL at 09:06

## 2018-05-17 RX ADMIN — STANDARDIZED SENNA CONCENTRATE AND DOCUSATE SODIUM 1 TABLET: 8.6; 5 TABLET, FILM COATED ORAL at 01:31

## 2018-05-17 RX ADMIN — Medication 10 ML: at 14:00

## 2018-05-17 RX ADMIN — Medication 10 ML: at 22:13

## 2018-05-17 RX ADMIN — MIRTAZAPINE 15 MG: 15 TABLET, FILM COATED ORAL at 22:12

## 2018-05-17 RX ADMIN — POLYETHYLENE GLYCOL 3350 17 G: 17 POWDER, FOR SOLUTION ORAL at 01:31

## 2018-05-17 NOTE — INTERDISCIPLINARY ROUNDS
IDR/SLIDR Summary          Patient: Aura Mack MRN: [id number]    Age: 80 y.o. YOB: 1935 Room/Bed: [facility]   Admit Diagnosis: Altered mental status  Dehydration  Numbness and tingling of both lower extremities  Inability to walk  Principal Diagnosis: Stroke (cerebrum) ([facility])   Goals: safety, placement  Readmission: NO  Quality Measure: Not applicable  VTE Prophylaxis: Mechanical  Influenza Vaccine screening completed? YES  Pneumococcal Vaccine screening completed? YES  Mobility needs: Yes   Nutrition plan:Yes  Consults:P.T, O.T. and Case Management    Financial concerns:Yes  Escalated to CM? YES  RRAT Score: 13   Interventions:  Testing due for pt today?  NO  LOS: 3 days Expected length of stay 2 days  Discharge plan: TBD   PCP: Steve Clark MD  Transportation needs: Yes    Days before discharge:one day until discharge   Discharge disposition: TBD    Brittney Rossi  5/17/2018  8:13 AM

## 2018-05-17 NOTE — PROGRESS NOTES
Spiritual Care Partner Volunteer visited patient in room 674 on 5.17.18. Documented by: : Rev. Claudetta Pinna.  Michelle Bui; [facility], to contact [phone] call: [phone]

## 2018-05-17 NOTE — PROGRESS NOTES
Hospitalist Progress Note  Linda Virk MD  Answering service: [phone] OR [phone] from in house phone        Date of Service:  2018  NAME:  Yared Ames  :  1935  MRN:  [id number]      Admission Summary:   An 72-year-old white female with past medical history of peripheral neuropathy, breast cancer status post bilateral mastectomy, hyperlipidemia, presented to the emergency department from home with altered mental status and chief complaint of bilateral leg and feet numbness. She has not complained of any new onset blurred vision, diplopia, slurred speech, neck pain, back pain, chest pain, shortness of breath, cough, congestion, abdominal pain, nausea, vomiting, diarrhea, melena, dysuria, hematuria or bowel or bladder incontinence calf pain, swelling, fever, chills, rash, head and neck trauma or falls. Her son and daughter-in-law later note that the patient has poor recollection of the events from earlier today. He notes that her symptoms were severe such that she was slumped over after eating a sandwich. There are no reports of any choking. There are no reports of any respiratory difficulty. He notes that rather she had loss of memory and amnesia of events that occurred this afternoon. He notes this is a definite change compared to her baseline. Evaluation in the emergency department for vital signs of blood pressure 125/72, heart rate 107, respirations 16, saturation 97% on room air. Labs showed a BUN of 23, creatinine 1.07. CT:  Head without contrast, atrophy, significant small vessel ischemic changes with a focal area of chronic ischemia in the left cerebellum with no acute abnormalities. Code stroke had been called. Interval history / Subjective:    Patient seen and examined; no complaints today, in good spirits, report LE pain and discomfort improved    No acute event overnight  Assessment & Plan:     #.  Altered mental status; resolved now,  -Metabolic encephalopathy, related to dehydration vs Acute stroke.  -Fall precaution, neuro check    #. Acute stroke: MRI brain showed left parietal convexity acute ischemic changes  without significant associated hemorrhage or mass effect.  -Carotid Doppler without hemodynamically significant stenosis  -TTE reported Normal LV systolic function, EF 92-77%, no obvious wall motion abnormality, grade 1 diastolic dysfunction. -neurology evaluation appreciated; suggested to change ASA to Plavix, continue Statin and  Stoke education   - event monitor ordered   #. Bilateral LE weakness associated with numbness;  -Probably related to severe peripheral neuropathy, on physical exam noted decreased LE sensation but the motor strength is good. -According to the pt, this has been going on for more than 2 months.  -On Gabapentin   -Vit B12 WNL, check Vit D, copper and MMA  -Arrange for Neuro F/U 4 weeks- may consider EMG PN protocol to further evaluate LE paresthesias. Neuropathy labs pending-ROLANDO added   -Continue PT/OT    #. Dehydration: continue gentle IV hydration, encourage PO intake     #. HTN  Mildly hypotensive  decrease metoprolol and amlodipine dose      #. Moderate Chronic Protein Calorie Malnutrition; nutritionist consult   #.  Insomnia: continue prn home meds for sleep  Code status: Full  DVT prophylaxis: SCD    Care Plan discussed with: Patient/Family and Nurse  Disposition: patient awaiting rehab/SNF placement, D/W [facility] Problems  Date Reviewed: 5/14/2018          Codes Class Noted POA    * (Principal)Stroke (cerebrum) ([facility] [address] ICD-10-CM: I63.9  ICD-9-CM: 434.91  5/16/2018 Unknown        Dehydration ICD-10-CM: E86.0  ICD-9-CM: 276.51  5/14/2018 Unknown        Altered mental status ICD-10-CM: R41.82  ICD-9-CM: 780.97  5/14/2018 Unknown        Inability to walk ICD-10-CM: R26.2  ICD-9-CM: 719.7  5/14/2018 Unknown        Numbness and tingling of both lower extremities ICD-10-CM: R20.0, R20.2  ICD-9-CM: 782.0  5/14/2018 Unknown                Review of Systems:   A comprehensive review of systems was negative except for that written in the HPI. Vital Signs:    Last 24hrs VS reviewed since prior progress note. Most recent are:  Visit Vitals    /49    Pulse 78    Temp 98 °F (36.7 °C)    Resp 15    Ht 5' 2\" (1.575 m)    Wt 46.7 kg (103 lb)    SpO2 98%    BMI 18.84 kg/m2         Intake/Output Summary (Last 24 hours) at 05/17/18 0930  Last data filed at 05/17/18 0700   Gross per 24 hour   Intake          3834.17 ml   Output              600 ml   Net          3234.17 ml        Physical Examination:             Constitutional:  No acute distress, cooperative, pleasant    ENT:  Oral mucous moist, oropharynx benign. Neck supple,    Resp:  CTA bilaterally. No wheezing/rhonchi/rales. No accessory muscle use   CV:  Regular rhythm, normal rate, no murmurs, gallops, rubs    GI:  Soft, non distended, non tender. normoactive bowel sounds, no hepatosplenomegaly     Musculoskeletal:  No edema, warm, 2+ pulses throughout    Neurologic:  Moves all extremities; decreased b/l LE sensation. AAOx3, CN II-XII reviewed     Psych:  Good insight, Not anxious nor agitated.        Data Review:    Review and/or order of clinical lab test  Review and/or order of tests in the radiology section of [facility]      Labs:     Recent Labs      05/17/18   0200  05/15/18   0248   WBC  8.3  8.7   HGB  10.7*  10.7*   HCT  33.0*  32.3*   PLT  190  193     Recent Labs      05/17/18   0200  05/15/18   0248  05/14/18   1859   NA  141  144  141   K  4.3  3.7  3.9   CL  106  112*  107   CO2  30  27  28   BUN  16  18  23*   CREA  0.81  0.81  1.07*   GLU  87  103*  86   CA  8.3*  8.3*  8.7   MG   --   1.9   --    PHOS   --   2.8   --      Recent Labs      05/17/18   0200  05/14/18   1859   SGOT  16  18   ALT  15  16   AP  46  56   TBILI  0.3  0.3   TP  5.7*  6.7   ALB  2.8*  3.7   GLOB  2.9  3.0     Recent Labs      05/14/18 1859 05/14/18   1845   INR  1.0  0.9   PTP  9.9   --    APTT  25.9   --       No results for input(s): FE, TIBC, PSAT, FERR in the last 72 hours. No results found for: FOL, RBCF   No results for input(s): PH, PCO2, PO2 in the last 72 hours.   Recent Labs      05/14/18 1859   TROIQ  <0.04     Lab Results   Component Value Date/Time    Cholesterol, total 105 05/16/2018 11:31 AM    HDL Cholesterol 58 05/16/2018 11:31 AM    LDL, calculated 21.6 05/16/2018 11:31 AM    Triglyceride 127 05/16/2018 11:31 AM    CHOL/HDL Ratio 1.8 05/16/2018 11:31 AM     Lab Results   Component Value Date/Time    Glucose (POC) 94 05/14/2018 06:43 PM     Lab Results   Component Value Date/Time    Color YELLOW/STRAW 05/14/2018 07:07 PM    Appearance CLEAR 05/14/2018 07:07 PM    Specific gravity <1.005 05/14/2018 07:07 PM    pH (UA) 5.5 05/14/2018 07:07 PM    Protein NEGATIVE  05/14/2018 07:07 PM    Glucose NEGATIVE  05/14/2018 07:07 PM    Ketone NEGATIVE  05/14/2018 07:07 PM    Bilirubin NEGATIVE  05/14/2018 07:07 PM    Urobilinogen 0.2 05/14/2018 07:07 PM    Nitrites NEGATIVE  05/14/2018 07:07 PM    Leukocyte Esterase NEGATIVE  05/14/2018 07:07 PM    Epithelial cells FEW 05/14/2018 07:07 PM    Bacteria NEGATIVE  05/14/2018 07:07 PM    WBC 0-4 05/14/2018 07:07 PM    RBC 0-5 05/14/2018 07:07 PM         Medications Reviewed:     Current Facility-Administered Medications   Medication Dose Route Frequency    [START ON 5/18/2018] amLODIPine (NORVASC) tablet 2.5 mg  2.5 mg Oral DAILY    [START ON 5/18/2018] atenolol (TENORMIN) tablet 12.5 mg  12.5 mg Oral DAILY    polyethylene glycol (MIRALAX) packet 17 g  17 g Oral DAILY    senna-docusate (PERICOLACE) 8.6-50 mg per tablet 1 Tab  1 Tab Oral DAILY    clopidogrel (PLAVIX) tablet 75 mg  75 mg Oral DAILY    acetaminophen (TYLENOL) tablet 650 mg  650 mg Oral Q6H PRN    levothyroxine (SYNTHROID) tablet 88 mcg  88 mcg Oral ACB    cyanocobalamin (VITAMIN B12) tablet 1,000 mcg  1,000 mcg Oral DAILY    gabapentin (NEURONTIN) capsule 300 mg  300 mg Oral DAILY    mirtazapine (REMERON) tablet 15 mg  15 mg Oral QHS    simvastatin (ZOCOR) tablet 20 mg  20 mg Oral QHS    temazepam (RESTORIL) capsule 30 mg  30 mg Oral QHS PRN    traZODone (DESYREL) tablet 100 mg  100 mg Oral QHS    sodium chloride (NS) flush 5-10 mL  5-10 mL IntraVENous Q8H    sodium chloride (NS) flush 5-10 mL  5-10 mL IntraVENous PRN    0.9% sodium chloride infusion  75 mL/hr IntraVENous CONTINUOUS    nicotine (NICODERM CQ) 21 mg/24 hr patch 1 Patch  1 Patch TransDERmal DAILY     ______________________________________________________________________  EXPECTED LENGTH OF STAY: 2d 2h  ACTUAL LENGTH OF STAY:          3                 Qi Sharp MD

## 2018-05-17 NOTE — PROGRESS NOTES
NUTRITION brief       Diet: regular  Supplements: Ensure Compact, Ensure Enlive    Pt visited for PO check today. Did fairly well with breakfast this morning per RN discussion, no protein consumed at lunch though. Pt reports doing well with Ensure Compact. Agreeable to increase to TID between meals (660kcal, 27g protein) and will continue regular Ensure with dinner (350kcal, 20g protein). If pt consumed at least Ensure Compacts will meet at least 50% of needs. Remeron rx which may help with appetite some. Also with some issues with constipation with laxative given today - pt asking for additional meds during visit. Plans for d/c to rehab per  notes. Plan to follow for PO intake, wt trends, and supplement consumption while she remains as inpatient.    Estimated Nutrition Needs:   Kcals/day: 1125 Kcals/day (1125-1260kcal)  Protein: 50 g (1.1g/kg)  Fluid: 1300 ml (~1ml/kcal)  Based On: Kcal/kg - specify (Comment) (25-28kcal/kg)  Weight Used: Actual wt (45kg)    Christine Morrell RD

## 2018-05-17 NOTE — PROGRESS NOTES
Problem: Self Care Deficits Care Plan (Adult)  Goal: *Acute Goals and Plan of Care (Insert Text)  Occupational Therapy Goals  Initiated 5/15/2018   1. Patient will perform grooming with supervision/set-up standing >3 minutes within 7 day(s). 2.  Patient will perform lower body dressing with modified independence within 7 day(s). 3.  Patient will perform toilet transfers with supervision/set-up to toilet within 7 day(s). 4.  Patient will perform all aspects of toileting with supervision/set-up within 7 day(s). 5.  Patient will participate in upper extremity therapeutic exercise/activities with independence for 5 minutes within 7 day(s). 6.  Patient will utilize energy conservation and fall prevention techniques during functional activities with verbal cues within 7 day(s). Occupational Therapy TREATMENT  Patient: Afua Tobar (46 y.o. female)  Date: 5/17/2018  Diagnosis: Altered mental status  Dehydration  Numbness and tingling of both lower extremities  Inability to walk Stroke (cerebrum) [facility])       Precautions: Fall  Chart, occupational therapy assessment, plan of care, and goals were reviewed. ASSESSMENT:  Pt making steady progress with acute therapy. Pt performed bed mobility, functional transfers/mobility, toileting, grooming standing at sink, and safety scenario/dynamic balance tasks. Pt alert, oriented x3. Pt is impulsive, poor safety awareness and insight, R inattention, R lateral lean and unaware of deficits, decreased attention/memory. Pt required CGA for all functional transfers and up to Qian for dynamic balance tasks. Pt was able to identify 3/4 safety scenarios in room, however, difficulty with problem-solving and requires max cues for complex reasoning, safety awareness. Pt lives alone in [place], [place] and is unsafe to return home alone at this time; strongly recommend rehab with 24/7 supervision/assistance. Pt returned to bed with bed alarm on, oriented to call bell and all needs met. Progression toward goals:  [x]       Improving appropriately and progressing toward goals  []       Improving slowly and progressing toward goals  []       Not making progress toward goals and plan of care will be adjusted     PLAN:  Patient continues to benefit from skilled intervention to address the above impairments. Continue treatment per established plan of care. Discharge Recommendations:  Rehab  Further Equipment Recommendations for Discharge:  TBD     SUBJECTIVE:   Patient stated I have the right to refuse; you are a sweet girl; but I am refusing that.  RE: rehab    OBJECTIVE DATA SUMMARY:   Cognitive/Behavioral Status:  Neurologic State: Alert;Confused  Orientation Level: Oriented to person;Oriented to place; Disoriented to situation  Cognition: Follows commands; Impulsive;Memory loss; Impaired decision making;Poor safety awareness  Perception: Cues to attend to right side of body;Verbal  Perseveration: No perseveration noted  Safety/Judgement: Awareness of environment;Decreased awareness of need for assistance;Decreased awareness of need for safety;Decreased insight into deficits    Functional Mobility and Transfers for ADLs:  Bed Mobility:  Supine to Sit: Stand-by assistance  Sit to Supine: Contact guard assistance    Transfers:  Sit to Stand: Contact guard assistance  Functional Transfers  Toilet Transfer : Contact guard assistance       Balance:  Sitting: Intact  Standing: Impaired; Without support  Standing - Static: Fair  Standing - Dynamic : Fair    ADL Intervention:       Grooming  Washing Hands: Stand-by assistance              Upper Body Dressing Assistance  Front Opened Shirt: Contact guard assistance         Toileting  Bladder Hygiene: Stand-by assistance  Clothing Management: Contact guard assistance    Cognitive Retraining  Safety/Judgement: Awareness of environment;Decreased awareness of need for assistance;Decreased awareness of need for safety;Decreased insight into deficits Activity Tolerance:   VSS    Please refer to the flowsheet for vital signs taken during this treatment.   After treatment:   [] Patient left in no apparent distress sitting up in chair  [x] Patient left in no apparent distress in bed  [x] Call bell left within reach  [x] Nursing notified  [] Caregiver present  [x] Bed alarm activated    COMMUNICATION/COLLABORATION:   The patients plan of care was discussed with: Physical Therapist, Registered Nurse and     Mary Kate Julian OT  Time Calculation: 20 mins

## 2018-05-17 NOTE — PROGRESS NOTES
2000 - assume pt. Care    0100 - pt. Demand additional laxative. Pt. Received Miralax 17g (05/16/18 - 1004AM) and senna-docusate 8.6-50 mg 1 tab (05/16/18 - 1004AM). RN notified MD Jaun Hercules. Received order to give AM dose for Miralax and senna-docusate now.

## 2018-05-17 NOTE — PROGRESS NOTES
Bedside and Verbal shift change report given to Mele Pemberton RN  (oncoming nurse) by Penny Lopez RN  (offgoing nurse). Report included the following information SBAR.

## 2018-05-17 NOTE — PROGRESS NOTES
Problem: Falls - Risk of  Goal: *Absence of Falls  Document Marcela Fall Risk and appropriate interventions in the flowsheet. Outcome: Progressing Towards Goal  Fall Risk Interventions:  Mobility Interventions: Assess mobility with egress test, Bed/chair exit alarm, Communicate number of staff needed for ambulation/transfer, OT consult for ADLs, Patient to call before getting OOB, PT Consult for mobility concerns, PT Consult for assist device competence, Strengthening exercises (ROM-active/passive)    Mentation Interventions: Adequate sleep, hydration, pain control, Bed/chair exit alarm, Door open when patient unattended, Evaluate medications/consider consulting pharmacy, Familiar objects from home, Eyeglasses and hearing aids, Family/sitter at bedside, Gait belt with transfers/ambulation, Increase mobility, Reorient patient, More frequent rounding, Room close to nurse's station, Self-releasing belt, Toileting rounds    Medication Interventions: Assess postural VS orthostatic hypotension, Bed/chair exit alarm, Patient to call before getting OOB, Evaluate medications/consider consulting pharmacy, Teach patient to arise slowly    Elimination Interventions: Call light in reach, Bed/chair exit alarm, Elevated toilet seat, Patient to call for help with toileting needs, Toilet paper/wipes in reach, Toileting schedule/hourly rounds             Problem: Pressure Injury - Risk of  Goal: *Prevention of pressure injury  Document Garth Scale and appropriate interventions in the flowsheet. Outcome: Progressing Towards Goal  Pressure Injury Interventions:  Sensory Interventions: Assess changes in LOC, Discuss PT/OT consult with provider, Keep linens dry and wrinkle-free, Maintain/enhance activity level, Use 30-degree side-lying position, Turn and reposition approx.  every two hours (pillows and wedges if needed), Assess need for specialty bed         Activity Interventions: Assess need for specialty bed, Increase time out of bed, Pressure redistribution bed/mattress(bed type), PT/OT evaluation    Mobility Interventions: Assess need for specialty bed, Float heels, HOB 30 degrees or less, Pressure redistribution bed/mattress (bed type), PT/OT evaluation, Turn and reposition approx.  every two hours(pillow and wedges)    Nutrition Interventions: Document food/fluid/supplement intake    Friction and Shear Interventions: Apply protective barrier, creams and emollients, Feet elevated on foot rest, HOB 30 degrees or less, Lift sheet, Lift team/patient mobility team

## 2018-05-17 NOTE — PROGRESS NOTES
Chart accessed to assist with [facility] for transition of care/discharge planning purposes. Permission given by SUNI Mosher and Guido.    Dom Moya RN  Care-manager  High Risk and Readmissions Coordinator  [phone]  [phone]

## 2018-05-17 NOTE — PROGRESS NOTES
CM received message back from [employer]- cannot screen patient for Medicaid due to state restrictions, family will have to follow-up with local Department of  in [address] [place] [address], MSW    8:40 a.m.- CM called and spoke with patient's son, Ira Weldon, and he provided choices for three facilities in [place], [place], however, stated that family also wants referrals sent to the [facility] area before making final decision. CM will send referrals to [facility], [address], and [facility]. Ira Weldon asked CM to call his brother Jaqueline Ferguson to receive choices for [facility] facilities. Family has not yet made decision on whether or not they want patient to receive rehab in [facility] vs. [address] [place] [address], MSW    8:58 a.m.- CM met with patient at bedside to discuss rehab, yesterday patient was open to rehab, however, this morning the patient is now saying that she does not want to go to rehab and states \"I can just walk out of here if I want. \" MD may have to evaluate patient to see if she is able to make own decisions- family has numerous concerns about patient living alone. Patient stated that she wanted to talk with her sons together- CM suggesting calling sons in room, patient stated \"I don't remember their numbers. \" CM informed patient that son Luz Marina Xiong (MPOA- sending MPOA document to CM to be scanned) asked CM to send referrals to [place] to have options, and will speak with Jaqueline Ferguson as well. CM provided extensive education on rehab and short-term stay under Medicare, patient had difficulty understanding. CM called and updated Conrad that patient is requesting to speak with sons and currently not open to rehab- family will need to discuss together. Pete [place], MSW    9:13 a.m.- CM spoke with Jaqueline Ferguson, the patient's son that lives locally, and Jaqueline Ferguson verbalized numerous concerns regarding patient returning home- stating patient cannot stay at his home.  CM updated Jaqueline Ferguson that patient is requesting to speak with family. CM discussed SNF options in [place]- he did not have any choices for CM at this time. CM spoke with MD and reinforced with patient that patient requires rehab placement. MARTIN Ochoa CM met with patient at bedside, willing to go to rehab- would like to pursue rehab in [place] to be closer to nieces, nephews, and friends. CM updated son [address]- agreeable, family stated they can provide transportation to [place]. CM will continue to follow and work on finding out-of-state bed. MARTIN Ochoa    10:25 a.m.- CM left a voicemail message for admission [facility] requesting a call back to discuss referral. MARTIN Ochoa    10:58 a.m.- LORY spoke with Patria Matute with the [facility] in [place] ([phone]), facility willing to accept patient if patient is willing to not smoke- patient was active smoker and facility is a non-smoking facility. CM will discuss with patient and family. MARTIN Ochoa    13:38 p.m.- LORY met with patient at bedside, patient is having difficulty recollecting previous education provided, circular in conversation. Patient again is verbalizing apprehension about going to rehab, however, told patient the [facility] and [facility] has accepted her. CM will speak with MASTER Barrow. MARTIN Ochoa    13:42 p.m. LORY spoke with Kolby Goldstein, the patient's son and [address] verbalized understanding [facility], however, CM explained that patient is currently apprehensive and stating she wants to go home- family questioning if patient can make decision. CM will continue to follow- family would like MD to call family to discuss. MARTIN Ochoa with [address] is requesting PASRR and FL2 form to be completed.  MARTIN Ochoa    14:51 p.m.- LORY spoke with Patria Matute with [address] [facility] ([phone], [phone]), requesting CM to go through [facility] for PASRR and FL2 forms- CM attempted to register with [facility] site, will not allow CM to complete assessment without obtaining organizational privileges with [facility]- CM spoke with Rosemary Ruiz, Complex Care , who stated Shanna Costello with [address] in [place], [place] may be able to assist due to having licensing in [place]- CM reached out to [facility] for support to see if she is able to complete assessment since this writer does not have access. Romario Lawson    15:03 p.m.- CM spoke with supervisor who stated hospital is working on getting access for [place] system for Medicaid assessments- will await to hear of other CM with [place] access are able to assist- supervisor aware of complex case and barriers to completing assessment. MARTIN Palomino    15:47 p.m.- CM called and spoke with the patient's son, Arlen Acosta, and Arlen Acosta is wanting to pursue [facility] SNF in [place] before deciding on [facility] and [address] [place] [address] has faxed information, and left additional voicemail for admissions requesting a call back. CM is completing FL2 form, with PASSR access assistance from Shanna Costello RN CM.  MARTIN Palomino

## 2018-05-17 NOTE — PROGRESS NOTES
Problem: Mobility Impaired (Adult and Pediatric)  Goal: *Acute Goals and Plan of Care (Insert Text)  Physical Therapy Goals  Initiated 5/15/2018  1. Patient will move from supine to sit and sit to supine  and scoot up and down in bed with modified independence within 7 day(s). 2.  Patient will transfer from bed to chair and chair to bed with modified independence using the least restrictive device within 7 day(s). 3.  Patient will perform sit to stand with modified independence within 7 day(s). 4.  Patient will ambulate with modified independence for 300 feet with the least restrictive device within 7 day(s). 5.  Patient will improve Fowler Balance score by 7 points within 7 days. physical Therapy TREATMENT  Patient: Reshma Rosales (73 y.o. female)  Date: 5/17/2018  Diagnosis: Altered mental status  Dehydration  Numbness and tingling of both lower extremities  Inability to walk Stroke (cerebrum) [facility])       Precautions: Fall  Chart, physical therapy assessment, plan of care and goals were reviewed. ASSESSMENT:  Cleared for mobility progression by RN. Received supine in bed - agreeable to participation in session. Continues to remain limited by STM deficits, intermittent confusion, poor insight, poor safety awareness, impaired gait, impaired balance, and impulsivity leading to high falls risk. Gait training progressed x350ft with CGA/min A, + LOBs requiring external assist to regain midline control with integration of sudden directional changes or obstacle avoidance. Performed standing B LE there ex for functional strengthening and improved standing dynamic balance. Remained up in chair at end of session, NAD. Continue to recommend discharge to SNF once medically cleared. Will follow.     Progression toward goals:  []       Improving appropriately and progressing toward goals  [x]       Improving slowly and progressing toward goals  []       Not making progress toward goals and plan of care will be adjusted PLAN:  Patient continues to benefit from skilled intervention to address the above impairments. Continue treatment per established plan of care. Discharge Recommendations:  [facility]  Further Equipment Recommendations for Discharge:  TBD     SUBJECTIVE:   Patient stated I lose my strength laying in bed.     OBJECTIVE DATA SUMMARY:   Critical Behavior:  Neurologic State: Alert, Confused  Orientation Level: Oriented to person, Oriented to place, Disoriented to situation  Cognition: Follows commands, Impulsive, Memory loss, Impaired decision making, Poor safety awareness  Safety/Judgement: Awareness of environment, Decreased awareness of need for assistance, Decreased awareness of need for safety, Decreased insight into deficits  Functional Mobility Training:  Bed Mobility:     Supine to Sit: Supervision  Sit to Supine: Contact guard assistance     Transfers:  Sit to Stand: Contact guard assistance  Stand to Sit: Contact guard assistance    Balance:  Sitting: Intact  Standing: Impaired; Without support  Standing - Static: Fair  Standing - Dynamic : Fair  Ambulation/Gait Training:  Distance (ft): 350 Feet (ft)  Assistive Device: Gait belt  Ambulation - Level of Assistance: Minimal assistance  Gait Abnormalities: Altered arm swing;Decreased step clearance; Path deviations;Trunk sway increased  Base of Support: Narrowed; Shift to right  Speed/Virgie: Shuffled;Fluctuations       Pain:  Pain Scale 1: Numeric (0 - 10)  Pain Intensity 1: 0     Activity Tolerance:   NAD    Please refer to the flowsheet for vital signs taken during this treatment.   After treatment:   [x] Patient left in no apparent distress sitting up in chair  [] Patient left in no apparent distress in bed  [x] Call bell left within reach  [x] Nursing notified  [] Caregiver present  [x] chair alarm activated    COMMUNICATION/EDUCATION:   The patients plan of care was discussed with: Occupational Therapist, Registered Nurse and Social Worker. Patient was educated regarding Her deficit(s) of impaired balance as this relates to Her diagnosis of CVA. She demonstrated Fair understanding as evidenced by nodding. Patient and/or family was verbally educated on the BE FAST acronym for signs/symptoms of CVA and TIA. BE FAST was written on patient's communication board  for visual education and reinforcement. All questions answered with patient indicating fair understanding. [x]  Fall prevention education was provided and the patient/caregiver indicated understanding. [x]  Patient/family have participated as able in goal setting and plan of care. [x]  Patient/family agree to work toward stated goals and plan of care. []  Patient understands intent and goals of therapy, but is neutral about his/her participation. []  Patient is unable to participate in goal setting and plan of care.     Thank you for this referral.  Brett Grier, PT, DPT   Time Calculation: 26 mins

## 2018-05-17 NOTE — PROGRESS NOTES
Bedside shift change report given to Maribel Montenegro (oncoming nurse) by Lexy Traore RN (offgoing nurse). Report included the following information SBAR, Kardex, ED Summary, Procedure Summary, Intake/Output, MAR, Accordion, Recent Results, Med Rec Status, Cardiac Rhythm NSR and Alarm Parameters .

## 2018-05-17 NOTE — PROGRESS NOTES
Problem: Falls - Risk of  Goal: *Absence of Falls  Document Marcela Fall Risk and appropriate interventions in the flowsheet. Outcome: Progressing Towards Goal  Fall Risk Interventions:  Mobility Interventions: Bed/chair exit alarm, Communicate number of staff needed for ambulation/transfer, Patient to call before getting OOB, PT Consult for mobility concerns, PT Consult for assist device competence    Mentation Interventions: Adequate sleep, hydration, pain control, Bed/chair exit alarm, Door open when patient unattended, Eyeglasses and hearing aids, Increase mobility, More frequent rounding, Room close to nurse's station, Update white board    Medication Interventions: Bed/chair exit alarm, Patient to call before getting OOB, Teach patient to arise slowly    Elimination Interventions: Bed/chair exit alarm, Call light in reach, Toileting schedule/hourly rounds    History of Falls Interventions: Bed/chair exit alarm, Consult care management for discharge planning, Room close to nurse's station        Problem: Pressure Injury - Risk of  Goal: *Prevention of pressure injury  Document Garth Scale and appropriate interventions in the flowsheet.    Outcome: Progressing Towards Goal  Pressure Injury Interventions:  Sensory Interventions: Assess changes in LOC, Keep linens dry and wrinkle-free, Maintain/enhance activity level, Minimize linen layers, Pressure redistribution bed/mattress (bed type)         Activity Interventions: Increase time out of bed, Pressure redistribution bed/mattress(bed type), PT/OT evaluation    Mobility Interventions: Float heels, PT/OT evaluation, Pressure redistribution bed/mattress (bed type)    Nutrition Interventions: Document food/fluid/supplement intake    Friction and Shear Interventions: Apply protective barrier, creams and emollients, Lift sheet, Minimize layers

## 2018-05-18 LAB
COPPER SERPL-MCNC: 70 UG/DL (ref 72–166)
METHYLMALONATE SERPL-SCNC: 229 NMOL/L (ref 0–378)

## 2018-05-18 PROCEDURE — 74011250637 HC RX REV CODE- 250/637: Performed by: PSYCHIATRY & NEUROLOGY

## 2018-05-18 PROCEDURE — 74011250637 HC RX REV CODE- 250/637: Performed by: FAMILY MEDICINE

## 2018-05-18 PROCEDURE — 74011250637 HC RX REV CODE- 250/637: Performed by: INTERNAL MEDICINE

## 2018-05-18 PROCEDURE — 97116 GAIT TRAINING THERAPY: CPT

## 2018-05-18 PROCEDURE — 65660000000 HC RM CCU STEPDOWN

## 2018-05-18 PROCEDURE — 74011250636 HC RX REV CODE- 250/636: Performed by: INTERNAL MEDICINE

## 2018-05-18 RX ADMIN — SODIUM CHLORIDE 75 ML/HR: 900 INJECTION, SOLUTION INTRAVENOUS at 11:04

## 2018-05-18 RX ADMIN — ATENOLOL 12.5 MG: 25 TABLET ORAL at 08:43

## 2018-05-18 RX ADMIN — CLOPIDOGREL BISULFATE 75 MG: 75 TABLET ORAL at 08:43

## 2018-05-18 RX ADMIN — SIMVASTATIN 20 MG: 40 TABLET, FILM COATED ORAL at 22:02

## 2018-05-18 RX ADMIN — TRAZODONE HYDROCHLORIDE 100 MG: 50 TABLET ORAL at 22:02

## 2018-05-18 RX ADMIN — Medication 10 ML: at 22:02

## 2018-05-18 RX ADMIN — LEVOTHYROXINE SODIUM 88 MCG: 88 TABLET ORAL at 06:55

## 2018-05-18 RX ADMIN — POLYETHYLENE GLYCOL 3350 17 G: 17 POWDER, FOR SOLUTION ORAL at 08:44

## 2018-05-18 RX ADMIN — Medication 10 ML: at 13:07

## 2018-05-18 RX ADMIN — AMLODIPINE BESYLATE 2.5 MG: 5 TABLET ORAL at 08:43

## 2018-05-18 RX ADMIN — Medication 1000 MCG: at 08:43

## 2018-05-18 RX ADMIN — STANDARDIZED SENNA CONCENTRATE AND DOCUSATE SODIUM 1 TABLET: 8.6; 5 TABLET, FILM COATED ORAL at 08:44

## 2018-05-18 RX ADMIN — MIRTAZAPINE 15 MG: 15 TABLET, FILM COATED ORAL at 22:02

## 2018-05-18 RX ADMIN — GABAPENTIN 300 MG: 100 CAPSULE ORAL at 08:43

## 2018-05-18 RX ADMIN — TEMAZEPAM 30 MG: 15 CAPSULE ORAL at 22:02

## 2018-05-18 RX ADMIN — Medication 10 ML: at 05:29

## 2018-05-18 NOTE — PROGRESS NOTES
Problem: Falls - Risk of  Goal: *Absence of Falls  Document Marcela Fall Risk and appropriate interventions in the flowsheet. Outcome: Progressing Towards Goal  Fall Risk Interventions:  Mobility Interventions: Bed/chair exit alarm, Communicate number of staff needed for ambulation/transfer, Patient to call before getting OOB, PT Consult for mobility concerns, PT Consult for assist device competence, Strengthening exercises (ROM-active/passive)    Mentation Interventions: Adequate sleep, hydration, pain control, Bed/chair exit alarm, Evaluate medications/consider consulting pharmacy, Door open when patient unattended, Eyeglasses and hearing aids, Familiar objects from home, Family/sitter at bedside, Increase mobility, More frequent rounding, Reorient patient, Toileting rounds, Update white board    Medication Interventions: Evaluate medications/consider consulting pharmacy, Bed/chair exit alarm, Patient to call before getting OOB, Teach patient to arise slowly    Elimination Interventions: Bed/chair exit alarm, Call light in reach, Patient to call for help with toileting needs, Elevated toilet seat, Toilet paper/wipes in reach, Toileting schedule/hourly rounds    History of Falls Interventions: Consult care management for discharge planning, Door open when patient unattended, Bed/chair exit alarm, Evaluate medications/consider consulting pharmacy, Investigate reason for fall, Room close to nurse's station        Problem: Pressure Injury - Risk of  Goal: *Prevention of pressure injury  Document Garth Scale and appropriate interventions in the flowsheet.    Outcome: Progressing Towards Goal  Pressure Injury Interventions:  Sensory Interventions: Assess changes in LOC, Assess need for specialty bed, Avoid rigorous massage over bony prominences, Check visual cues for pain, Discuss PT/OT consult with provider, Float heels, Keep linens dry and wrinkle-free, Minimize linen layers, Maintain/enhance activity level, Monitor skin under medical devices, Pressure redistribution bed/mattress (bed type), Sit a 90-degree angle/use footstool if needed         Activity Interventions: Increase time out of bed, Pressure redistribution bed/mattress(bed type), PT/OT evaluation    Mobility Interventions: HOB 30 degrees or less, Pressure redistribution bed/mattress (bed type), PT/OT evaluation    Nutrition Interventions: Document food/fluid/supplement intake, Discuss nutritional consult with provider, Offer support with meals,snacks and hydration    Friction and Shear Interventions: Feet elevated on foot rest, Foam dressings/transparent film/skin sealants, HOB 30 degrees or less, Lift sheet, Sit at 90-degree angle, Minimize layers

## 2018-05-18 NOTE — PROGRESS NOTES
Problem: Falls - Risk of  Goal: *Absence of Falls  Document Marcela Fall Risk and appropriate interventions in the flowsheet. Outcome: Progressing Towards Goal  Fall Risk Interventions:  Mobility Interventions: Bed/chair exit alarm, Communicate number of staff needed for ambulation/transfer, PT Consult for mobility concerns, OT consult for ADLs, Patient to call before getting OOB    Mentation Interventions: Adequate sleep, hydration, pain control, Bed/chair exit alarm, Door open when patient unattended, Increase mobility, More frequent rounding, Reorient patient, Room close to nurse's station, Update white board    Medication Interventions: Teach patient to arise slowly, Bed/chair exit alarm, Patient to call before getting OOB    Elimination Interventions: Call light in reach, Patient to call for help with toileting needs, Toileting schedule/hourly rounds, Bed/chair exit alarm    History of Falls Interventions: Room close to nurse's station, Investigate reason for fall, Consult care management for discharge planning, Door open when patient unattended        Problem: Pressure Injury - Risk of  Goal: *Prevention of pressure injury  Document Garth Scale and appropriate interventions in the flowsheet.    Outcome: Progressing Towards Goal  Pressure Injury Interventions:  Sensory Interventions: Assess changes in LOC, Check visual cues for pain, Discuss PT/OT consult with provider, Keep linens dry and wrinkle-free, Maintain/enhance activity level, Minimize linen layers         Activity Interventions: Increase time out of bed, Pressure redistribution bed/mattress(bed type), PT/OT evaluation    Mobility Interventions: HOB 30 degrees or less, Pressure redistribution bed/mattress (bed type), PT/OT evaluation    Nutrition Interventions: Document food/fluid/supplement intake, Offer support with meals,snacks and hydration    Friction and Shear Interventions: HOB 30 degrees or less, Lift sheet

## 2018-05-18 NOTE — INTERDISCIPLINARY ROUNDS
IDR/SLIDR Summary          Patient: Kaleigh Huynh MRN: [id number]    Age: 80 y.o. YOB: 1935 Room/Bed: [facility]   Admit Diagnosis: Altered mental status  Dehydration  Numbness and tingling of both lower extremities  Inability to walk  Principal Diagnosis: Stroke (cerebrum) (HCC)   Goals: D/C planning  Readmission: NO  Quality Measure: Not applicable  VTE Prophylaxis: Mechanical  Influenza Vaccine screening completed? YES  Pneumococcal Vaccine screening completed? NO  Mobility needs: Yes   Nutrition plan:Yes  Consults:P.T, O.T. and Case Management    Financial concerns:Yes  Escalated to CM? YES  RRAT Score: 13   Interventions: TBD  Testing due for pt today?  NO  LOS: 4 days Expected length of stay 2 days  Discharge plan: TBD   PCP: Steve Clark MD  Transportation needs: Yes    Days before discharge:one day until discharge   Discharge disposition: TBD    Signed:     Tang Gonzales RN  5/18/2018  4:38 AM

## 2018-05-18 NOTE — PROGRESS NOTES
Hospitalist Progress Note  David Clark MD  Answering service: [phone] OR [phone] from in house phone        Date of Service:  2018  NAME:  Makenna Victor  :  1935  MRN:  [id number]      Admission Summary:   An 57-year-old white female with past medical history of peripheral neuropathy, breast cancer status post bilateral mastectomy, hyperlipidemia, presented to the emergency department from home with altered mental status and chief complaint of bilateral leg and feet numbness. She has not complained of any new onset blurred vision, diplopia, slurred speech, neck pain, back pain, chest pain, shortness of breath, cough, congestion, abdominal pain, nausea, vomiting, diarrhea, melena, dysuria, hematuria or bowel or bladder incontinence calf pain, swelling, fever, chills, rash, head and neck trauma or falls. Her son and daughter-in-law later note that the patient has poor recollection of the events from earlier today. He notes that her symptoms were severe such that she was slumped over after eating a sandwich. There are no reports of any choking. There are no reports of any respiratory difficulty. He notes that rather she had loss of memory and amnesia of events that occurred this afternoon. He notes this is a definite change compared to her baseline. Evaluation in the emergency department for vital signs of blood pressure 125/72, heart rate 107, respirations 16, saturation 97% on room air. Labs showed a BUN of 23, creatinine 1.07. CT:  Head without contrast, atrophy, significant small vessel ischemic changes with a focal area of chronic ischemia in the left cerebellum with no acute abnormalities. Code stroke had been called. Interval history / Subjective:    Patient seen and examined; no complaints today, in good spirits, report LE pain and discomfort improved    No acute event overnight  Assessment & Plan:     #.  Altered mental status; resolved now,  -Metabolic encephalopathy, related to dehydration vs Acute stroke.  -Fall precaution, neuro check    #. Acute stroke: MRI brain showed left parietal convexity acute ischemic changes  without significant associated hemorrhage or mass effect.  -Carotid Doppler without hemodynamically significant stenosis  -TTE reported Normal LV systolic function, EF 79-20%, no obvious wall motion abnormality, grade 1 diastolic dysfunction. -neurology evaluation appreciated; suggested to change ASA to Plavix, continue Statin and  Stoke education   - event monitor ordered   #. Bilateral LE weakness associated with numbness;  -Probably related to severe peripheral neuropathy, on physical exam noted decreased LE sensation but the motor strength is good. -According to the pt, this has been going on for more than 2 months.  -On Gabapentin   -Vit B12 WNL, check Vit D, copper and MMA  -Arrange for Neuro F/U 4 weeks- may consider EMG PN protocol to further evaluate LE paresthesias. Neuropathy labs pending-ROLANDO added   -Continue PT/OT    #. Dehydration: continue gentle IV hydration, encourage PO intake Patient agreeable to IVF     #. HTN  decreased metoprolol and amlodipine dose 5/17   - monitor      #. Moderate Chronic Protein Calorie Malnutrition; nutritionist consult   #.  Insomnia: continue prn home meds for sleep  Code status: Full  DVT prophylaxis: SCD    Care Plan discussed with: Patient/Family and Nurse  Disposition: patient awaiting rehab/SNF placement, D/W [facility] Problems  Date Reviewed: 5/14/2018          Codes Class Noted POA    * (Principal)Stroke (cerebrum) ([facility] [address] ICD-10-CM: I63.9  ICD-9-CM: 434.91  5/16/2018 Unknown        Dehydration ICD-10-CM: E86.0  ICD-9-CM: 276.51  5/14/2018 Unknown        Altered mental status ICD-10-CM: R41.82  ICD-9-CM: 780.97  5/14/2018 Unknown        Inability to walk ICD-10-CM: R26.2  ICD-9-CM: 719.7  5/14/2018 Unknown        Numbness and tingling of both lower extremities ICD-10-CM: R20.0, R20.2  ICD-9-CM: 782.0  5/14/2018 Unknown                Review of Systems:   A comprehensive review of systems was negative except for that written in the HPI. Vital Signs:    Last 24hrs VS reviewed since prior progress note. Most recent are:  Visit Vitals    /66 (BP 1 Location: Right arm, BP Patient Position: At rest)    Pulse 84    Temp 98.5 °F (36.9 °C)    Resp 20    Ht 5' 2\" (1.575 m)    Wt 47.4 kg (104 lb 9.6 oz)    SpO2 95%    BMI 19.13 kg/m2         Intake/Output Summary (Last 24 hours) at 05/18/18 1129  Last data filed at 05/17/18 1252   Gross per 24 hour   Intake               60 ml   Output                0 ml   Net               60 ml        Physical Examination:             Constitutional:  No acute distress, cooperative, pleasant    ENT:  Oral mucous moist, oropharynx benign. Neck supple,    Resp:  CTA bilaterally. No wheezing/rhonchi/rales. No accessory muscle use   CV:  Regular rhythm, normal rate, no murmurs, gallops, rubs    GI:  Soft, non distended, non tender. normoactive bowel sounds, no hepatosplenomegaly     Musculoskeletal:  No edema, warm, 2+ pulses throughout    Neurologic:  Moves all extremities; decreased b/l LE sensation. AAOx3, CN II-XII reviewed     Psych:  Good insight, Not anxious nor agitated. Data Review:    Review and/or order of clinical lab test  Review and/or order of tests in the radiology section of CPT      Labs:     Recent Labs      05/17/18   0200   WBC  8.3   HGB  10.7*   HCT  33.0*   PLT  190     Recent Labs      05/17/18   0200   NA  141   K  4.3   CL  106   CO2  30   BUN  16   CREA  0.81   GLU  87   CA  8.3*     Recent Labs      05/17/18   0200   SGOT  16   ALT  15   AP  46   TBILI  0.3   TP  5.7*   ALB  2.8*   GLOB  2.9     No results for input(s): INR, PTP, APTT in the last 72 hours. No lab exists for component: INREXT, INREXT   No results for input(s): FE, TIBC, PSAT, FERR in the last 72 hours.    No results found for: FOL, RBCF   No results for input(s): PH, PCO2, PO2 in the last 72 hours. No results for input(s): CPK, CKNDX, TROIQ in the last 72 hours.     No lab exists for component: CPKMB  Lab Results   Component Value Date/Time    Cholesterol, total 105 05/16/2018 11:31 AM    HDL Cholesterol 58 05/16/2018 11:31 AM    LDL, calculated 21.6 05/16/2018 11:31 AM    Triglyceride 127 05/16/2018 11:31 AM    CHOL/HDL Ratio 1.8 05/16/2018 11:31 AM     Lab Results   Component Value Date/Time    Glucose (POC) 94 05/14/2018 06:43 PM     Lab Results   Component Value Date/Time    Color YELLOW/STRAW 05/14/2018 07:07 PM    Appearance CLEAR 05/14/2018 07:07 PM    Specific gravity <1.005 05/14/2018 07:07 PM    pH (UA) 5.5 05/14/2018 07:07 PM    Protein NEGATIVE  05/14/2018 07:07 PM    Glucose NEGATIVE  05/14/2018 07:07 PM    Ketone NEGATIVE  05/14/2018 07:07 PM    Bilirubin NEGATIVE  05/14/2018 07:07 PM    Urobilinogen 0.2 05/14/2018 07:07 PM    Nitrites NEGATIVE  05/14/2018 07:07 PM    Leukocyte Esterase NEGATIVE  05/14/2018 07:07 PM    Epithelial cells FEW 05/14/2018 07:07 PM    Bacteria NEGATIVE  05/14/2018 07:07 PM    WBC 0-4 05/14/2018 07:07 PM    RBC 0-5 05/14/2018 07:07 PM         Medications Reviewed:     Current Facility-Administered Medications   Medication Dose Route Frequency    amLODIPine (NORVASC) tablet 2.5 mg  2.5 mg Oral DAILY    atenolol (TENORMIN) tablet 12.5 mg  12.5 mg Oral DAILY    bisacodyl (DULCOLAX) suppository 10 mg  10 mg Rectal DAILY PRN    polyethylene glycol (MIRALAX) packet 17 g  17 g Oral DAILY    senna-docusate (PERICOLACE) 8.6-50 mg per tablet 1 Tab  1 Tab Oral DAILY    clopidogrel (PLAVIX) tablet 75 mg  75 mg Oral DAILY    acetaminophen (TYLENOL) tablet 650 mg  650 mg Oral Q6H PRN    levothyroxine (SYNTHROID) tablet 88 mcg  88 mcg Oral ACB    cyanocobalamin (VITAMIN B12) tablet 1,000 mcg  1,000 mcg Oral DAILY    gabapentin (NEURONTIN) capsule 300 mg  300 mg Oral DAILY    mirtazapine (REMERON) tablet 15 mg  15 mg Oral QHS    simvastatin (ZOCOR) tablet 20 mg  20 mg Oral QHS    temazepam (RESTORIL) capsule 30 mg  30 mg Oral QHS PRN    traZODone (DESYREL) tablet 100 mg  100 mg Oral QHS    sodium chloride (NS) flush 5-10 mL  5-10 mL IntraVENous Q8H    sodium chloride (NS) flush 5-10 mL  5-10 mL IntraVENous PRN    0.9% sodium chloride infusion  75 mL/hr IntraVENous CONTINUOUS    nicotine (NICODERM CQ) 21 mg/24 hr patch 1 Patch  1 Patch TransDERmal DAILY     ______________________________________________________________________  EXPECTED LENGTH OF STAY: 2d 2h  ACTUAL LENGTH OF STAY:          4                 Rama Corley MD

## 2018-05-18 NOTE — PROGRESS NOTES
Problem: Mobility Impaired (Adult and Pediatric)  Goal: *Acute Goals and Plan of Care (Insert Text)  Physical Therapy Goals  Initiated 5/15/2018  1. Patient will move from supine to sit and sit to supine  and scoot up and down in bed with modified independence within 7 day(s). 2.  Patient will transfer from bed to chair and chair to bed with modified independence using the least restrictive device within 7 day(s). 3.  Patient will perform sit to stand with modified independence within 7 day(s). 4.  Patient will ambulate with modified independence for 300 feet with the least restrictive device within 7 day(s). 5.  Patient will improve Fowler Balance score by 7 points within 7 days. physical Therapy TREATMENT: WEEKLY REASSESSMENT- neuro population  Patient: Boni Cunningham (60 y.o. female)  Date: 5/18/2018  Diagnosis: Altered mental status  Dehydration  Numbness and tingling of both lower extremities  Inability to walk Stroke (cerebrum) [facility])       Precautions: Fall  Chart, physical therapy assessment, plan of care and goals were reviewed. ASSESSMENT:  Cleared for mobility by RN, pt reports anticipating discharge to SNF tomorrow. Pt pleasantly agreeable to participation in tx session. She remaines limited by decreased insight to safety and deficits, STM loss, impaired gait, and impaired balance limiting ability to complete mobility tasks safely. Gait training progressed in hallway x450 ft with emphasis on dual task integration and path navigation in small spaces - continue to note R lean, occasional scissor step, and moderate LOBs with impulsivity requiring physical assist to recover from. Recommend discharge to SNF level rehab once medically cleared, will follow. Patient's progression toward goals since last assessment: steady progress towards all goals      PLAN:  Goals have been updated based on progression since last assessment.   Patient continues to benefit from skilled intervention to address the above impairments. Continue to follow the patient 5 times a week to address goals. Planned Interventions:  [x]              Bed Mobility Training             [x]       Neuromuscular Re-Education  [x]              Transfer Training                   []       Orthotic/Prosthetic Training  [x]              Gait Training                         []       Modalities  [x]              Therapeutic Exercises           []       Edema Management/Control  [x]              Therapeutic Activities            [x]       Patient and Family Training/Education  []              Other (comment):  Discharge Recommendations: Skilled Nursing Facility  Further Equipment Recommendations for Discharge: TBD     SUBJECTIVE:   Patient stated How long will I have to be in rehab.     OBJECTIVE DATA SUMMARY:   Critical Behavior:  Neurologic State: Alert, Confused  Orientation Level: Oriented X4 (periodic confusion)  Cognition: Decreased attention/concentration, Decreased command following, Impaired decision making, Memory loss, Poor safety awareness  Safety/Judgement: Awareness of environment, Decreased awareness of need for assistance, Decreased awareness of need for safety, Decreased insight into deficits       Functional Mobility Training:  Bed Mobility:     Supine to Sit: Stand-by assistance         Transfers:  Sit to Stand: Contact guard assistance  Stand to Sit: Contact guard assistance     Balance:  Sitting: Intact  Standing: Impaired  Standing - Static: Fair  Standing - Dynamic : Fair  Ambulation/Gait Training:  Distance (ft): 450 Feet (ft)  Assistive Device: Gait belt  Ambulation - Level of Assistance: Contact guard assistance;Minimal assistance  Gait Abnormalities: Altered arm swing;Path deviations;Trunk sway increased  Base of Support: Narrowed; Shift to right  Speed/Virgie: Shuffled;Fluctuations         Pain:  Pain Scale 1: Numeric (0 - 10)  Pain Intensity 1: 0         Activity Tolerance:   NAD    Please refer to the flowsheet for vital signs taken during this treatment.   After treatment:   [x]  Patient left in no apparent distress sitting up in chair  []  Patient left in no apparent distress in bed  [x]  Call bell left within reach  [x]  Nursing notified  [x]  Caregiver present  [x]  Chair alarm activated    COMMUNICATION/COLLABORATION:   The patients plan of care was discussed with: Registered Nurse    Anthony Porter, PT, DPT   Time Calculation: 30 mins

## 2018-05-18 NOTE — ROUTINE PROCESS
Bedside shift change report given to Santos Avalos RN (oncoming nurse) by Shae Herrera RN (offgoing nurse). Report included the following information SBAR, Kardex, ED Summary, Procedure Summary, Intake/Output, MAR, Accordion, Recent Results and Cardiac Rhythm NSR.

## 2018-05-18 NOTE — PROGRESS NOTES
Bedside and Verbal shift change report given to Francisco Hooker RN (oncoming nurse) by Mir Prather RN (offgoing nurse). Report included the following information SBAR, Kardex, Procedure Summary, Intake/Output, MAR, Recent Results and Cardiac Rhythm NSR.

## 2018-05-18 NOTE — PROGRESS NOTES
CM received information from Brooke Arriaza RN, who is assisting in completing PASRR for [place] for transition of care, that patient will require statement from MD stating that patient will require 30 days or less of rehab- CM notified MD, LORY will send documentation to Brooke Arriaza. The patient may require additional review from [place] due to specific medications patient is actively taking. CM will continue to follow. MARTIN Warren    9:13 a.m.- LORY called and spoke with Xu Navarro in admissions at [facility] ([phone]) can accept patient for rehab- also requiring completed PASRR forms for state [facility], in addition to signed form from MD (MD has signed). Facility can possibly accept patient Saturday or Monday, however, cannot accept on Sunday. CM will continue to follow. MARTIN Warren CM received PASRR information from Brooke Arriaza RN,  Providing assistance. The CM called and spoke with the patient's Harjit Carvajal, and he provided choice for [facility] at [facility] for SNF placement. CM attempted to meet with patient at bedside- patient is sleeping and did not awake to verbal stimuli. The CM faxed PASRR information and FL2 form to facility (N:[phone]). Facility requires discharge summary by 12:00 p.m. Noon. Family will provide transport- awaiting to see if patient will be agreeable to go to rehab. MARTIN Warren CM received call from Xu Navarro in admissions- facility confirmed receipt of FL2 forms, can accept patient. MARTIN Warren    12:36 p.m.- LORY met with patient at bedside to discuss rehab, patient has forgotten previous education provided to her. The patient stated \"I will do whatever my sons want me to do\" and is now agreeable to rehab.  LORY called and spoke with Humberto Arreola, the patient's son that lives locally, and he endorsed ability to provide transportation tomorrow to SNF.     12:41 p.m.- CM called and left a voicemail message for Xu Navarro in admissions at [facility] ([phone]) informing her to expect patient tomorrow. MARTIN Chang CM spoke with [facility]- able and willing to accept patient tomorrow. Will call CM back with number to call report- discharge summary must be faxed by noon to facility (N:[phone]). MARTIN Chang CM called and spoke with BeulahAmy Crockettma, and family is aware of discharge tomorrow- family will provide transport.  MARTIN Chang

## 2018-05-18 NOTE — PROGRESS NOTES
Bedside shift change report given to Rebekah Metz (oncoming nurse) by Siddharth Mckenzie (offgoing nurse). Report included the following information SBAR, Kardex, Procedure Summary, MAR, Accordion, Recent Results and Cardiac Rhythm NSR.

## 2018-05-19 VITALS
WEIGHT: 106.7 LBS | OXYGEN SATURATION: 94 % | RESPIRATION RATE: 15 BRPM | SYSTOLIC BLOOD PRESSURE: 107 MMHG | HEART RATE: 77 BPM | HEIGHT: 62 IN | DIASTOLIC BLOOD PRESSURE: 60 MMHG | BODY MASS INDEX: 19.64 KG/M2 | TEMPERATURE: 98.2 F

## 2018-05-19 PROCEDURE — 74011250637 HC RX REV CODE- 250/637: Performed by: FAMILY MEDICINE

## 2018-05-19 PROCEDURE — 74011250637 HC RX REV CODE- 250/637: Performed by: INTERNAL MEDICINE

## 2018-05-19 PROCEDURE — 74011250637 HC RX REV CODE- 250/637: Performed by: PSYCHIATRY & NEUROLOGY

## 2018-05-19 RX ORDER — TRAZODONE HYDROCHLORIDE 50 MG/1
100 TABLET ORAL
Qty: 30 TAB | Refills: 0 | Status: SHIPPED | OUTPATIENT
Start: 2018-05-19

## 2018-05-19 RX ORDER — LEVOTHYROXINE SODIUM 88 UG/1
88 TABLET ORAL
Qty: 30 TAB | Refills: 0 | Status: SHIPPED
Start: 2018-05-20

## 2018-05-19 RX ORDER — ATENOLOL 25 MG/1
12.5 TABLET ORAL DAILY
Qty: 30 TAB | Refills: 0 | Status: SHIPPED
Start: 2018-05-19

## 2018-05-19 RX ORDER — AMLODIPINE BESYLATE 5 MG/1
2.5 TABLET ORAL DAILY
Qty: 30 TAB | Refills: 0 | Status: SHIPPED
Start: 2018-05-19

## 2018-05-19 RX ORDER — TEMAZEPAM 15 MG/1
30 CAPSULE ORAL
Qty: 30 CAP | Refills: 0 | Status: SHIPPED | OUTPATIENT
Start: 2018-05-19

## 2018-05-19 RX ORDER — CLOPIDOGREL BISULFATE 75 MG/1
75 TABLET ORAL DAILY
Qty: 30 TAB | Refills: 0 | Status: SHIPPED
Start: 2018-05-20

## 2018-05-19 RX ADMIN — GABAPENTIN 300 MG: 100 CAPSULE ORAL at 08:49

## 2018-05-19 RX ADMIN — ATENOLOL 12.5 MG: 25 TABLET ORAL at 08:50

## 2018-05-19 RX ADMIN — AMLODIPINE BESYLATE 2.5 MG: 5 TABLET ORAL at 08:50

## 2018-05-19 RX ADMIN — CLOPIDOGREL BISULFATE 75 MG: 75 TABLET ORAL at 08:49

## 2018-05-19 RX ADMIN — Medication 1000 MCG: at 08:49

## 2018-05-19 RX ADMIN — Medication 10 ML: at 05:02

## 2018-05-19 RX ADMIN — LEVOTHYROXINE SODIUM 88 MCG: 88 TABLET ORAL at 06:34

## 2018-05-19 RX ADMIN — POLYETHYLENE GLYCOL 3350 17 G: 17 POWDER, FOR SOLUTION ORAL at 08:49

## 2018-05-19 RX ADMIN — STANDARDIZED SENNA CONCENTRATE AND DOCUSATE SODIUM 1 TABLET: 8.6; 5 TABLET, FILM COATED ORAL at 08:50

## 2018-05-19 NOTE — PROGRESS NOTES
TRANSFER - OUT REPORT:    Verbal report given to [facility], RN(name) on Caity Douglas  being transferred to [employer] at [place] in [place], [place](unit) for routine progression of care       Report consisted of patients Situation, Background, Assessment and   Recommendations(SBAR). Information from the following report(s) SBAR, Kardex, ED Summary, Procedure Summary, Intake/Output, MAR, Accordion, Recent Results and Cardiac Rhythm NSR was reviewed with the receiving nurse. Opportunity for questions and clarification was provided.

## 2018-05-19 NOTE — INTERDISCIPLINARY ROUNDS
IDR/SLIDR Summary          Patient: Nba Becerra MRN: [id number]    Age: 80 y.o. YOB: 1935 Room/Bed: [facility]   Admit Diagnosis: Altered mental status  Dehydration  Numbness and tingling of both lower extremities  Inability to walk  Principal Diagnosis: Stroke (cerebrum) ([facility])   Goals: d/c planning  Readmission: NO  Quality Measure: Not applicable  VTE Prophylaxis: Mechanical  Influenza Vaccine screening completed? YES  Pneumococcal Vaccine screening completed? NO  Mobility needs: Yes   Nutrition plan:Yes  Consults:P.T, O.T. and Case Management    Financial concerns:No  Escalated to CM? YES  RRAT Score: 13   Interventions: TBD  Testing due for pt today? NO  LOS: 5 days Expected length of stay 2 days  Discharge plan:  To [facility]   PCP: Steve Clark MD  Transportation needs: No    Days before discharge:ready for discharge  Discharge disposition: Rehab    Signed:     Tanya Eddy RN  5/19/2018  12:58 AM

## 2018-05-19 NOTE — PROGRESS NOTES
Bedside shift change report given to [facility] ELENA CUELLO (oncoming nurse) by Penny Clark (offgoing nurse). Report included the following information SBAR, Kardex, Procedure Summary, MAR, Accordion, Recent Results and Cardiac Rhythm NSR.

## 2018-05-19 NOTE — PROGRESS NOTES
Problem: Falls - Risk of  Goal: *Absence of Falls  Document Marcela Fall Risk and appropriate interventions in the flowsheet. Outcome: Progressing Towards Goal  Fall Risk Interventions:  Mobility Interventions: Bed/chair exit alarm, Communicate number of staff needed for ambulation/transfer, OT consult for ADLs, Patient to call before getting OOB, PT Consult for mobility concerns    Mentation Interventions: Adequate sleep, hydration, pain control, Bed/chair exit alarm, Door open when patient unattended, Increase mobility, More frequent rounding, Reorient patient, Room close to nurse's station, Update white board    Medication Interventions: Patient to call before getting OOB, Teach patient to arise slowly, Bed/chair exit alarm    Elimination Interventions: Bed/chair exit alarm, Call light in reach, Patient to call for help with toileting needs, Toileting schedule/hourly rounds    History of Falls Interventions: Door open when patient unattended, Investigate reason for fall, Room close to nurse's station, Bed/chair exit alarm        Problem: Pressure Injury - Risk of  Goal: *Prevention of pressure injury  Document Garth Scale and appropriate interventions in the flowsheet.    Outcome: Progressing Towards Goal  Pressure Injury Interventions:  Sensory Interventions: Assess changes in LOC, Avoid rigorous massage over bony prominences, Check visual cues for pain, Discuss PT/OT consult with provider, Keep linens dry and wrinkle-free, Maintain/enhance activity level, Minimize linen layers, Pressure redistribution bed/mattress (bed type)         Activity Interventions: Increase time out of bed, Pressure redistribution bed/mattress(bed type), PT/OT evaluation    Mobility Interventions: HOB 30 degrees or less, Pressure redistribution bed/mattress (bed type), PT/OT evaluation    Nutrition Interventions: Document food/fluid/supplement intake, Offer support with meals,snacks and hydration    Friction and Shear Interventions: HOB 30 degrees or less, Lift sheet

## 2018-05-19 NOTE — DISCHARGE SUMMARY
Discharge Summary       PATIENT ID: Valarie Werner  MRN: [id number]   YOB: 1935    DATE OF ADMISSION: 5/14/2018  6:40 PM    DATE OF DISCHARGE: 5/19/2018  PRIMARY CARE PROVIDER: Vinny Velarde MD     ATTENDING PHYSICIAN: Gogo Del Rosario  DISCHARGING PROVIDER: Nicki Lopez MD    To contact this individual call [phone] and ask the  to page. If unavailable ask to be transferred the Adult Hospitalist Department. CONSULTATIONS: IP CONSULT TO HOSPITALIST  IP CONSULT TO NEUROLOGY  IP CONSULT TO PSYCHIATRY    PROCEDURES/SURGERIES: * No surgery found *      ADMITTING DIAGNOSES:  An 59-year-old white female with past medical history of peripheral neuropathy, breast cancer status post bilateral mastectomy, hyperlipidemia, hypothyroidism, presented to the emergency department from home with altered mental status and chief complaint of bilateral leg and feet numbness. She has not complained of any new onset blurred vision, diplopia, slurred speech, neck pain, back pain, chest pain, shortness of breath, cough, congestion, abdominal pain, nausea, vomiting, diarrhea, melena, dysuria, hematuria or bowel or bladder incontinence calf pain, swelling, fever, chills, rash, head and neck trauma or falls. Her son and daughter-in-law later note that the patient has poor recollection of the events from earlier today. He notes that her symptoms were severe such that she was slumped over after eating a sandwich. There are no reports of any choking. There are no reports of any respiratory difficulty. He notes that rather she had loss of memory and amnesia of events that occurred this afternoon. He notes this is a definite change compared to her baseline. Evaluation in the emergency department for vital signs of blood pressure 125/72, heart rate 107, respirations 16, saturation 97% on room air. Labs showed a BUN of 23, creatinine 1.07.   CT:  Head without contrast, atrophy, significant small vessel ischemic changes with a focal area of chronic ischemia in the left cerebellum with no acute abnormalities. Code stroke had been called. Patient admitted to neurology unit. HOSPITAL COURSE and DISCHARGE DIAGNOSES / PLAN:      #. Altered mental status; resolved now,  -Metabolic encephalopathy, related to dehydration vs Acute stroke. #. Acute stroke: MRI brain showed left parietal convexity acute ischemic changes  without significant associated hemorrhage or mass effect.  -Carotid Doppler without hemodynamically significant stenosis  -TTE reported Normal LV systolic function, EF 75-98%, no obvious wall motion abnormality, grade 1 diastolic dysfunction. -neurology evaluation appreciated; suggested to change ASA to Plavix, continue Statin and Stoke education     #. Bilateral LE weakness associated with numbness;  -Probably related to severe peripheral neuropathy, on physical exam noted decreased LE sensation but the motor strength is good. -According to the pt, this has been going on for more than 2 months.  -On Gabapentin   -Vit B12 WNL,  Vit D normal, copper 70 and ; Immune electrophoresis IgG 556 others with in normal limit.  -Arrange for Neuro F/U 2-4 weeks- may consider EMG PN protocol to further evaluate LE paresthesias.  -Continue PT/OT     #. Dehydration: continue gentle IV hydration, encourage PO intake Patient agreeable to IVF      #. HTN  BP meds adjusted; decreased Amlodipine to 2.5mg daily and Atenolol 12.5mg daily     #. Hypothyroidism; on levothyroxine     #. Moderate Chronic Protein Calorie Malnutrition; nutritionist consult   #.  Insomnia: continue prn home meds for sleep         PENDING TEST RESULTS:   At the time of discharge the following test results are still pending: none    FOLLOW UP APPOINTMENTS:    Follow-up Information     Follow up With Details Comments [id number] [facility] at [address] [place] [address]. [phone]    Steve Clark MD   Patient can only remember the practice name and not the physician             ADDITIONAL CARE RECOMMENDATIONS: Follow up with neurology for possible EMG    DIET: Regular Diet  Oral Nutritional Supplements: Ensure Enlive Twice daily        ACTIVITY: Activity as tolerated        DISCHARGE MEDICATIONS:   DISCHARGE MEDICATIONS:  Current Discharge Medication List      START taking these medications    Details   clopidogrel (PLAVIX) 75 mg tab Take 1 Tab by mouth daily. Qty: 30 Tab, Refills: 0      levothyroxine (SYNTHROID) 88 mcg tablet Take 1 Tab by mouth Daily (before breakfast). Qty: 30 Tab, Refills: 0         CONTINUE these medications which have CHANGED    Details   amLODIPine (NORVASC) 5 mg tablet Take 0.5 Tabs by mouth daily. Indications: hypertension  Qty: 30 Tab, Refills: 0      atenolol (TENORMIN) 25 mg tablet Take 0.5 Tabs by mouth daily. Qty: 30 Tab, Refills: 0      temazepam (RESTORIL) 15 mg capsule Take 2 Caps by mouth nightly as needed for Sleep. Max Daily Amount: 30 mg. Indications: Insomnia  Qty: 30 Cap, Refills: 0    Associated Diagnoses: Numbness and tingling of both lower extremities      traZODone (DESYREL) 50 mg tablet Take 2 Tabs by mouth nightly. Qty: 30 Tab, Refills: 0         CONTINUE these medications which have NOT CHANGED    Details   mirtazapine (REMERON) 15 mg tablet Take 15 mg by mouth nightly. simvastatin (ZOCOR) 20 mg tablet Take 20 mg by mouth nightly. cyanocobalamin (VITAMIN B-12) 1,000 mcg tablet Take 1,000 mcg by mouth daily. gabapentin (NEURONTIN) 100 mg capsule Take 100 mg by mouth three (3) times daily. NOTIFY YOUR PHYSICIAN FOR ANY OF THE FOLLOWING:   Fever over 101 degrees for 24 hours. Chest pain, shortness of breath, fever, chills, nausea, vomiting, diarrhea, change in mentation, falling, weakness, bleeding. Severe pain or pain not relieved by medications. Or, any other signs or symptoms that you may have questions about.     DISPOSITION:    Home With:   OT PT  HH  RN      x SNF/Inpatient Rehab/LTAC    Independent/assisted living    Hospice    Other:       PATIENT CONDITION AT DISCHARGE:     Functional status   x Poor     Deconditioned     Independent      Cognition   x  Lucid     Forgetful     Dementia      Catheters/lines (plus indication)    [place]     PICC     PEG    x None      Code status    x Full code     DNR      PHYSICAL EXAMINATION AT DISCHARGE:     Constitutional:  No acute distress, cooperative, pleasant    ENT:  Oral mucous moist, oropharynx benign. Neck supple,    Resp:  CTA bilaterally. No wheezing/rhonchi/rales. No accessory muscle use   CV:  Regular rhythm, normal rate, no murmurs, gallops, rubs    GI:  Soft, non distended, non tender. normoactive bowel sounds, no hepatosplenomegaly     Musculoskeletal:  No edema, warm, 2+ pulses throughout    Neurologic:  Moves all extremities; decreased b/l LE sensation. AAOx3, CN II-XII reviewed                                             Psych:  Good insight, Not anxious nor agitated.         CHRONIC MEDICAL DIAGNOSES:  Problem List as of 5/19/2018  Date Reviewed: 5/14/2018          Codes Class Noted - Resolved    * (Principal)[facility] ICD-10-CM: I63.9  ICD-9-CM: 434.91  5/16/2018 - Present        Dehydration ICD-10-CM: E86.0  ICD-9-CM: 276.51  5/14/2018 - Present        Altered mental status ICD-10-CM: R41.82  ICD-9-CM: 780.97  5/14/2018 - Present        Inability to walk ICD-10-CM: R26.2  ICD-9-CM: 719.7  5/14/2018 - Present        Numbness and tingling of both lower extremities ICD-10-CM: R20.0, R20.2  ICD-9-CM: 782.0  5/14/2018 - Present                  Greater than 35 minutes were spent with the patient on counseling and coordination of care    Signed:   Jersey Reddy MD  5/19/2018  10:07 AM

## 2018-05-19 NOTE — DISCHARGE INSTRUCTIONS
Discharge SNF/Rehab Instructions/LTAC       PATIENT ID: Rosetta Caballero  MRN: [id number]   YOB: 1935    DATE OF ADMISSION: 5/14/2018  6:40 PM    DATE OF DISCHARGE: 5/19/2018    PRIMARY CARE PROVIDER: Carlyle Armstrong MD       ATTENDING PHYSICIAN: Jatin Miranda MD  DISCHARGING PROVIDER: Jatin Miranda MD     To contact this individual call [phone] and ask the  to page. If unavailable ask to be transferred the Adult Hospitalist Department. CONSULTATIONS: IP CONSULT TO HOSPITALIST  IP CONSULT TO NEUROLOGY  IP CONSULT TO PSYCHIATRY    PROCEDURES/SURGERIES: * No surgery found *    ADMITTING DIAGNOSES:  An 51-year-old white female with past medical history of peripheral neuropathy, breast cancer status post bilateral mastectomy, hyperlipidemia, hypothyroidism, presented to the emergency department from home with altered mental status and chief complaint of bilateral leg and feet numbness. She has not complained of any new onset blurred vision, diplopia, slurred speech, neck pain, back pain, chest pain, shortness of breath, cough, congestion, abdominal pain, nausea, vomiting, diarrhea, melena, dysuria, hematuria or bowel or bladder incontinence calf pain, swelling, fever, chills, rash, head and neck trauma or falls. Her son and daughter-in-law later note that the patient has poor recollection of the events from earlier today. He notes that her symptoms were severe such that she was slumped over after eating a sandwich. There are no reports of any choking. There are no reports of any respiratory difficulty. He notes that rather she had loss of memory and amnesia of events that occurred this afternoon. He notes this is a definite change compared to her baseline. Evaluation in the emergency department for vital signs of blood pressure 125/72, heart rate 107, respirations 16, saturation 97% on room air. Labs showed a BUN of 23, creatinine 1.07.   CT:  Head without contrast, atrophy, significant small vessel ischemic changes with a focal area of chronic ischemia in the left cerebellum with no acute abnormalities. Code stroke had been called. Patient admitted to neurology unit. HOSPITAL COURSE and DISCHARGE DIAGNOSES / PLAN:      #. Altered mental status; resolved now,  -Metabolic encephalopathy, related to dehydration vs Acute stroke. #. Acute stroke: MRI brain showed left parietal convexity acute ischemic changes  without significant associated hemorrhage or mass effect.  -Carotid Doppler without hemodynamically significant stenosis  -TTE reported Normal LV systolic function, EF 86-44%, no obvious wall motion abnormality, grade 1 diastolic dysfunction. -neurology evaluation appreciated; suggested to change ASA to Plavix, continue Statin and Stoke education     #. Bilateral LE weakness associated with numbness;  -Probably related to severe peripheral neuropathy, on physical exam noted decreased LE sensation but the motor strength is good. -According to the pt, this has been going on for more than 2 months.  -On Gabapentin   -Vit B12 WNL,  Vit D normal, copper 70 and ; Immune electrophoresis IgG 556 others with in normal limit.  -Arrange for Neuro F/U 2-4 weeks- may consider EMG PN protocol to further evaluate LE paresthesias.  -Continue PT/OT     #. Dehydration: continue gentle IV hydration, encourage PO intake Patient agreeable to IVF      #. HTN  BP meds adjusted; decreased Amlodipine to 2.5mg daily and Atenolol 12.5mg daily     #. Hypothyroidism; on levothyroxine     #. Moderate Chronic Protein Calorie Malnutrition; nutritionist consult   #.  Insomnia: continue prn home meds for sleep         PENDING TEST RESULTS:   At the time of discharge the following test results are still pending: none    FOLLOW UP APPOINTMENTS:    Follow-up Information     Follow up With Details Comments [id number] [facility] at [address] [place] [address]. [phone]    Phys Other, MD   Patient can only remember the practice name and not the physician             ADDITIONAL CARE RECOMMENDATIONS: Follow up with neurology for possible EMG    DIET: Regular Diet  Oral Nutritional Supplements: Ensure Enlive Twice daily        ACTIVITY: Activity as tolerated        DISCHARGE MEDICATIONS:   See Medication Reconciliation Form      NOTIFY YOUR PHYSICIAN FOR ANY OF THE FOLLOWING:   Fever over 101 degrees for 24 hours. Chest pain, shortness of breath, fever, chills, nausea, vomiting, diarrhea, change in mentation, falling, weakness, bleeding. Severe pain or pain not relieved by medications. Or, any other signs or symptoms that you may have questions about. DISPOSITION:    Home With:   OT  PT  HH  RN      x SNF/Inpatient Rehab/LTAC    Independent/assisted living    Hospice    Other:       PATIENT CONDITION AT DISCHARGE:     Functional status   x Poor     Deconditioned     Independent      Cognition   x  Lucid     Forgetful     Dementia      Catheters/lines (plus indication)    [place]     PICC     PEG    x None      Code status    x Full code     DNR      PHYSICAL EXAMINATION AT DISCHARGE:     Constitutional:  No acute distress, cooperative, pleasant    ENT:  Oral mucous moist, oropharynx benign. Neck supple,    Resp:  CTA bilaterally. No wheezing/rhonchi/rales. No accessory muscle use   CV:  Regular rhythm, normal rate, no murmurs, gallops, rubs    GI:  Soft, non distended, non tender. normoactive bowel sounds, no hepatosplenomegaly     Musculoskeletal:  No edema, warm, 2+ pulses throughout    Neurologic:  Moves all extremities; decreased b/l LE sensation. AAOx3, CN II-XII reviewed                                             Psych:  Good insight, Not anxious nor agitated.         CHRONIC MEDICAL DIAGNOSES:  Problem List as of 5/19/2018  Date Reviewed: 5/14/2018          Codes Class Noted - Resolved    * (Principal)[facility]br[facility]) [facility] ICD-10-CM: I63.9  ICD-9-CM: 434.91  5/16/2018 - Present Dehydration ICD-10-CM: E86.0  ICD-9-CM: 276.51  5/14/2018 - Present        Altered mental status ICD-10-CM: R41.82  ICD-9-CM: 780.97  5/14/2018 - Present        Inability to walk ICD-10-CM: R26.2  ICD-9-CM: 719.7  5/14/2018 - Present        Numbness and tingling of both lower extremities ICD-10-CM: R20.0, R20.2  ICD-9-CM: 782.0  5/14/2018 - Present                  Signed:   Elsy Suarez MD  5/19/2018  9:52 AM

## 2018-05-19 NOTE — PROGRESS NOTES
This pt is being discharged to [facility] in [facility] today. CM called Charlee McDuffie [phone] at [facility] to confirm that they have a bed for this pt and they do. CM faxed pt's d/c summary, AVS, kardex and MARS to [facility]. CM spoke with pt's son, Porter Deras and confirmed that he is coming to pick this pt up shortly to take her to the facility. An envelope containing pt's kardex, MARs and AVS will be sent with this pt to [facility]. Pt's nurse will also call report.  Iban Núñez

## 2018-05-19 NOTE — PROGRESS NOTES
Stroke Education documented in Patient Education: YES  Core Measures Documented in [facility]:  Risk Factors: YES  Warning signs of stroke: YES  When to Activate 911: YES  Medication Education for Risk Factors: YES  Smoking cessation if applicable: YES  Written Education Given:  YES    Discharge NIH Completed: YES  Score: 1    BRAINS: YES    Follow Up Appointment Made: NO. .. Date/Time if applicable: To be made with Neurologist [place]     Bedside RN performed patient education and medication education. Discharge concerns initiated and discussed with patient, including clarification on \"who\" assists the patient at their home and instructions for when the home going patient should call their provider after discharge. Opportunity for questions and clarification was provided. Patient receptive to education: YES  Patient stated: Verbal w teachback  Barriers to Education: N/A  Diagnosis Education given:  YES    Length of stay: 5  Expected Day of Discharge: 5  Ask if they have \"Help at Home\" & add to white board?   YES    Education Day #: 5    Medication Education Given:  YES  M in the box Medication name: Restoril and trazodone    Pt aware of HCAHPS survey: YES

## 2018-05-21 LAB
IGA SERPL-MCNC: 113 MG/DL (ref 64–422)
IGG SERPL-MCNC: 556 MG/DL (ref 700–1600)
IGM SERPL-MCNC: 39 MG/DL (ref 26–217)
PROT PATTERN SERPL IFE-IMP: ABNORMAL

## 2018-05-23 ENCOUNTER — DOCUMENTATION ONLY (OUTPATIENT)
Dept: CASE MANAGEMENT | Age: 83
End: 2018-05-23

## 2018-05-23 NOTE — PROGRESS NOTES
The objective of todays phone call was to review the transitional care plan with the patient's family [facility]. The patient was discharged to [facility] [place] [facility]. We discussed the importance of transitional care as it relates to reducing the likelihood of readmission. We reviewed the goals for the first 30 days following hospital discharge. The patient's POA Conrad  and PEPE discussed wrap around services including nurse navigation, care coordination, home health, transitional care appointments with patient's primary care provider and specialist team. Patient/family  education focused on readmission zones as described as  The Red Zone: High risk for readmission, days 1-21  The Yellow Zone: Moderate risk for readmission, days 22-29  The Green Zone: Lower risk for readmission, days 30 and after    The patient's son Jacinda Hernandez  was instructed on worrisome symptoms for worsening of patient's medical conditions and sepsis. Conrad verbalized understanding :importance of BERNARDA appointment with PCP within 2 days of discharge from SNF along with a follow up with neurology. Patient and family are to bring all documents from the SNF including medication discharge list and medications themselves. MPOA will sign medical release at each provider's office so [facility] medical records are available to local providers. The patient's son Jacinda Hernandez  identified appropriate wrap around services during this interaction. He will be attending a family meeting at the SNF today regarding future plans for the patient along with LTC services/Medicaid and a safety plan. Jacinda Hernandez is interested in having the patient's cognitive functioning tested. NN informed that neurology may perform a neuropsychiatric evaluation to determine executive functioning.   Patient does have a MPOA and Advance Directive per her son        The patient's family  expressed the following specific concerns regarding their discharge  Financial barriers: LTC screening will be requested by family. Medication side effects: Family will discuss with facility staff. Transportation problems: NONE    Patient's son Aye Montoya will contact NN if I may be of further assistance.

## 2018-06-21 NOTE — PROCEDURES
[address]  MR#: [id number]  : 1935  ACCOUNT #: [de-identified]   DATE OF SERVICE: 2018    1. Monitor placed 2018 to 06/15/2018.  2.  Baseline strip 2018 with symptoms of dizziness showed sinus rhythm. No other strips of arrhythmia were noted. No other events were noted.       MD BINU Cao / [place]  D: 2018 11:18     T: 2018 12:20  JOB #: [id number]